# Patient Record
Sex: MALE | Race: WHITE | URBAN - METROPOLITAN AREA
[De-identification: names, ages, dates, MRNs, and addresses within clinical notes are randomized per-mention and may not be internally consistent; named-entity substitution may affect disease eponyms.]

---

## 2020-12-29 ENCOUNTER — INPATIENT (INPATIENT)
Facility: HOSPITAL | Age: 85
LOS: 8 days | Discharge: HOME | DRG: 177 | End: 2021-01-07
Attending: INTERNAL MEDICINE | Admitting: INTERNAL MEDICINE
Payer: MEDICARE

## 2020-12-29 VITALS
OXYGEN SATURATION: 95 % | HEART RATE: 90 BPM | DIASTOLIC BLOOD PRESSURE: 62 MMHG | RESPIRATION RATE: 20 BRPM | SYSTOLIC BLOOD PRESSURE: 93 MMHG

## 2020-12-29 LAB
ALBUMIN SERPL ELPH-MCNC: 3.3 G/DL — SIGNIFICANT CHANGE UP (ref 3.3–5)
ALP SERPL-CCNC: 76 U/L — SIGNIFICANT CHANGE UP (ref 40–120)
ALT FLD-CCNC: 13 U/L — SIGNIFICANT CHANGE UP (ref 10–45)
ANION GAP SERPL CALC-SCNC: 14 MMOL/L — SIGNIFICANT CHANGE UP (ref 5–17)
AST SERPL-CCNC: 23 U/L — SIGNIFICANT CHANGE UP (ref 10–40)
BASE EXCESS BLDV CALC-SCNC: 0.9 MMOL/L — SIGNIFICANT CHANGE UP (ref -2–2)
BASOPHILS # BLD AUTO: 0.03 K/UL — SIGNIFICANT CHANGE UP (ref 0–0.2)
BASOPHILS NFR BLD AUTO: 0.4 % — SIGNIFICANT CHANGE UP (ref 0–2)
BILIRUB SERPL-MCNC: 0.3 MG/DL — SIGNIFICANT CHANGE UP (ref 0.2–1.2)
BUN SERPL-MCNC: 44 MG/DL — HIGH (ref 7–23)
CA-I SERPL-SCNC: 1.22 MMOL/L — SIGNIFICANT CHANGE UP (ref 1.12–1.3)
CALCIUM SERPL-MCNC: 9 MG/DL — SIGNIFICANT CHANGE UP (ref 8.4–10.5)
CHLORIDE BLDV-SCNC: 100 MMOL/L — SIGNIFICANT CHANGE UP (ref 96–108)
CHLORIDE SERPL-SCNC: 97 MMOL/L — SIGNIFICANT CHANGE UP (ref 96–108)
CO2 BLDV-SCNC: 28 MMOL/L — SIGNIFICANT CHANGE UP (ref 22–30)
CO2 SERPL-SCNC: 23 MMOL/L — SIGNIFICANT CHANGE UP (ref 22–31)
CREAT SERPL-MCNC: 2.15 MG/DL — HIGH (ref 0.5–1.3)
EOSINOPHIL # BLD AUTO: 1.13 K/UL — HIGH (ref 0–0.5)
EOSINOPHIL NFR BLD AUTO: 14.4 % — HIGH (ref 0–6)
GAS PNL BLDV: 136 MMOL/L — SIGNIFICANT CHANGE UP (ref 135–145)
GAS PNL BLDV: SIGNIFICANT CHANGE UP
GAS PNL BLDV: SIGNIFICANT CHANGE UP
GLUCOSE BLDV-MCNC: 126 MG/DL — HIGH (ref 70–99)
GLUCOSE SERPL-MCNC: 125 MG/DL — HIGH (ref 70–99)
HCO3 BLDV-SCNC: 27 MMOL/L — SIGNIFICANT CHANGE UP (ref 21–29)
HCT VFR BLD CALC: 35.2 % — LOW (ref 39–50)
HCT VFR BLDA CALC: 37 % — LOW (ref 39–50)
HGB BLD CALC-MCNC: 12.1 G/DL — LOW (ref 13–17)
HGB BLD-MCNC: 11.4 G/DL — LOW (ref 13–17)
HOROWITZ INDEX BLDV+IHG-RTO: SIGNIFICANT CHANGE UP
IMM GRANULOCYTES NFR BLD AUTO: 0.1 % — SIGNIFICANT CHANGE UP (ref 0–1.5)
LACTATE BLDV-MCNC: 2.2 MMOL/L — HIGH (ref 0.7–2)
LYMPHOCYTES # BLD AUTO: 2.65 K/UL — SIGNIFICANT CHANGE UP (ref 1–3.3)
LYMPHOCYTES # BLD AUTO: 33.8 % — SIGNIFICANT CHANGE UP (ref 13–44)
MCHC RBC-ENTMCNC: 29.2 PG — SIGNIFICANT CHANGE UP (ref 27–34)
MCHC RBC-ENTMCNC: 32.4 GM/DL — SIGNIFICANT CHANGE UP (ref 32–36)
MCV RBC AUTO: 90 FL — SIGNIFICANT CHANGE UP (ref 80–100)
MONOCYTES # BLD AUTO: 0.26 K/UL — SIGNIFICANT CHANGE UP (ref 0–0.9)
MONOCYTES NFR BLD AUTO: 3.3 % — SIGNIFICANT CHANGE UP (ref 2–14)
NEUTROPHILS # BLD AUTO: 3.77 K/UL — SIGNIFICANT CHANGE UP (ref 1.8–7.4)
NEUTROPHILS NFR BLD AUTO: 48 % — SIGNIFICANT CHANGE UP (ref 43–77)
NRBC # BLD: 0 /100 WBCS — SIGNIFICANT CHANGE UP (ref 0–0)
PCO2 BLDV: 51 MMHG — HIGH (ref 35–50)
PH BLDV: 7.34 — LOW (ref 7.35–7.45)
PLATELET # BLD AUTO: 110 K/UL — LOW (ref 150–400)
PO2 BLDV: 36 MMHG — SIGNIFICANT CHANGE UP (ref 25–45)
POTASSIUM BLDV-SCNC: 4.5 MMOL/L — SIGNIFICANT CHANGE UP (ref 3.5–5.3)
POTASSIUM SERPL-MCNC: 4.5 MMOL/L — SIGNIFICANT CHANGE UP (ref 3.5–5.3)
POTASSIUM SERPL-SCNC: 4.5 MMOL/L — SIGNIFICANT CHANGE UP (ref 3.5–5.3)
PROT SERPL-MCNC: 7.4 G/DL — SIGNIFICANT CHANGE UP (ref 6–8.3)
RBC # BLD: 3.91 M/UL — LOW (ref 4.2–5.8)
RBC # FLD: 14.2 % — SIGNIFICANT CHANGE UP (ref 10.3–14.5)
SAO2 % BLDV: 59 % — LOW (ref 67–88)
SODIUM SERPL-SCNC: 134 MMOL/L — LOW (ref 135–145)
TROPONIN T, HIGH SENSITIVITY RESULT: 29 NG/L — SIGNIFICANT CHANGE UP (ref 0–51)
WBC # BLD: 7.85 K/UL — SIGNIFICANT CHANGE UP (ref 3.8–10.5)
WBC # FLD AUTO: 7.85 K/UL — SIGNIFICANT CHANGE UP (ref 3.8–10.5)

## 2020-12-29 PROCEDURE — 93010 ELECTROCARDIOGRAM REPORT: CPT

## 2020-12-29 PROCEDURE — 99285 EMERGENCY DEPT VISIT HI MDM: CPT | Mod: CS,GC

## 2020-12-29 RX ORDER — SODIUM CHLORIDE 9 MG/ML
1000 INJECTION INTRAMUSCULAR; INTRAVENOUS; SUBCUTANEOUS ONCE
Refills: 0 | Status: COMPLETED | OUTPATIENT
Start: 2020-12-29 | End: 2020-12-29

## 2020-12-29 RX ADMIN — SODIUM CHLORIDE 1000 MILLILITER(S): 9 INJECTION INTRAMUSCULAR; INTRAVENOUS; SUBCUTANEOUS at 23:30

## 2020-12-29 NOTE — ED ADULT NURSE NOTE - OBJECTIVE STATEMENT
89y M, A&Ox2, disoriented to year, PMH CLL (not on treatment), AAA s/p repair, R kidney removal 2/2 kidney CA  presents to the ED c/o loss of appetite over past several weeks, moreso over past 4 days. Pt visited PCP yesterday, dx possibly dehydrated and pneumonia. Pt fell earlier this morning out of bed unwitnessed w/ complaints of R sided extremity and hip pain. While being assisted to bathroom, pt syncopized- witnessed by family. Gross neuro intact, course crackles heard bilaterally, no difficulty speaking in complete sentences, s1s2 heart sounds heard, pulses x 4, abdomen soft nontender nondistended, multiple skin tears and hematomas present on assessment.

## 2020-12-29 NOTE — ED PROVIDER NOTE - OBJECTIVE STATEMENT
89M CLL (not on treatment), AAA s/p repair, R kidney removal 2/2 kidney CA  p/w loss of appetite over past several weeks & especially over past 4 days. Seen by PCP yesterday who said pt possibly dehydrated, started pt on treatment for PNA (levaquin), pt has been hypotensive, barely tolerating PO. Pt fell earlier this morning out of bed unwitnessed w/ complaints of R sided extremity, hip pain. This pm while being assisted to bathroom syncopized witnessed by family w/o further trauma.     PCP Kang oGodman ([facility]) 89M CLL (not on treatment), AAA s/p repair, R kidney removal 2/2 kidney CA  p/w loss of appetite over past several weeks & especially over past 4 days. Seen by PCP yesterday who said pt possibly dehydrated, started pt on treatment for PNA (levaquin), pt has been hypotensive, barely tolerating PO. Pt fell earlier this morning out of bed unwitnessed w/ complaints of R sided extremity, hip pain. This pm while being assisted to bathroom temorarily lost consciousness for one minute witnessed by family w/o further trauma.  Per family pt feels very unwell. PT aox3 however very little insight into own situation, unsure of events occuring recently.     PCP Kang Goodman ([facility]) 89M CLL (not on treatment), AAA s/p repair, R kidney removal 2/2 kidney CA  p/w loss of appetite over past several weeks & especially over past 4 days. Seen by PCP yesterday who said pt possibly dehydrated, started pt on treatment for PNA (levaquin), pt has been hypotensive, barely tolerating PO. Pt fell earlier this morning out of bed unwitnessed w/ complaints of R sided extremity, hip pain. This pm while being assisted to bathroom temporarily lost consciousness for one minute witnessed by family w/o further trauma.  Per family pt feels very unwell. PT aox3 however very little insight into own situation, unsure of events occurring recently.     PCP Kang Goodman ([facility])

## 2020-12-29 NOTE — ED PROVIDER NOTE - PHYSICAL EXAMINATION
.pe Gen: WDWN, NAD, cachectic appearing, BP 90s/60s  HEENT: EOMI, no nasal discharge, mucous membranes moist  CV: RRR, no M/R/G  Resp: Scattered crackles b/l worse on L side w/ decreased BS throughout   GI: Abdomen soft non-distended, NTTP, no masses  Skin: Multiple scattered skin tears on extremities, abrasion on L shin   Neuro: CN2-12 grossly intact, A&Ox4, MS +5/5 in UE and LE BL, gross sensation intact in UE and LE BL, gait deferred, negative pronator drift  Psych: appropriate mood, denies SI  MSK: no LE swelling, full ROM LUE,b/l LE w/ pain-limited ROM R shoulder, no neck tenderness

## 2020-12-29 NOTE — ED PROVIDER NOTE - NS ED ROS FT
Gen: Denies fever, weight loss  CV: Denies chest pain, palpitations  Skin: + abrasions + ecchymoses   Resp: Denies SOB + productive cough   GI: Denies constipation, nausea, vomiting, abd pain  Msk: Denies back pain, LE swelling, neck pain, + extremity pain  : Denies dysuria, increased frequency  Neuro: + LOC + weakness   Psych: Denies hx of psych, hallucinations

## 2020-12-29 NOTE — ED ADULT NURSE NOTE - NSIMPLEMENTINTERV_GEN_ALL_ED
Implemented All Fall Risk Interventions:  [place] to call system. Call bell, personal items and telephone within reach. Instruct patient to call for assistance. Room bathroom lighting operational. Non-slip footwear when patient is off stretcher. Physically safe environment: no spills, clutter or unnecessary equipment. Stretcher in lowest position, wheels locked, appropriate side rails in place. Provide visual cue, wrist band, yellow gown, etc. Monitor gait and stability. Monitor for mental status changes and reorient to person, place, and time. Review medications for side effects contributing to fall risk. Reinforce activity limits and safety measures with patient and family.

## 2020-12-29 NOTE — ED PROVIDER NOTE - ATTENDING CONTRIBUTION TO CARE
pt is a 88 y/o male with syncope doesn't recall the event, to discuss with family, ekg with ST but apparently pos family with covid from home, possible infectious cause, vss, infectious, cardiac work up ordered, non focal neuro exam, no signs of injury, signed out pending work up.

## 2020-12-29 NOTE — ED ADULT NURSE NOTE - CHIEF COMPLAINT QUOTE
syncope, fell today, hypotension. c/o rt shoulder, arm pain, on aspirin. Seen and examined by PMD and was Dx with Bronchitis, swab pending results.

## 2020-12-30 DIAGNOSIS — R55 SYNCOPE AND COLLAPSE: ICD-10-CM

## 2020-12-30 DIAGNOSIS — E78.5 HYPERLIPIDEMIA, UNSPECIFIED: ICD-10-CM

## 2020-12-30 DIAGNOSIS — F03.90 UNSPECIFIED DEMENTIA WITHOUT BEHAVIORAL DISTURBANCE: ICD-10-CM

## 2020-12-30 DIAGNOSIS — N39.0 URINARY TRACT INFECTION, SITE NOT SPECIFIED: ICD-10-CM

## 2020-12-30 DIAGNOSIS — N17.9 ACUTE KIDNEY FAILURE, UNSPECIFIED: ICD-10-CM

## 2020-12-30 DIAGNOSIS — E86.0 DEHYDRATION: ICD-10-CM

## 2020-12-30 LAB
APPEARANCE UR: ABNORMAL
APTT BLD: 24 SEC — LOW (ref 27.5–35.5)
BACTERIA # UR AUTO: ABNORMAL
BASE EXCESS BLDV CALC-SCNC: 1.2 MMOL/L — SIGNIFICANT CHANGE UP (ref -2–2)
BILIRUB UR-MCNC: NEGATIVE — SIGNIFICANT CHANGE UP
CA-I SERPL-SCNC: 1.13 MMOL/L — SIGNIFICANT CHANGE UP (ref 1.12–1.3)
CHLORIDE BLDV-SCNC: 106 MMOL/L — SIGNIFICANT CHANGE UP (ref 96–108)
CO2 BLDV-SCNC: 28 MMOL/L — SIGNIFICANT CHANGE UP (ref 22–30)
COLOR SPEC: YELLOW — SIGNIFICANT CHANGE UP
DIFF PNL FLD: ABNORMAL
EPI CELLS # UR: 4 — SIGNIFICANT CHANGE UP
GAS PNL BLDV: 132 MMOL/L — LOW (ref 135–145)
GAS PNL BLDV: SIGNIFICANT CHANGE UP
GAS PNL BLDV: SIGNIFICANT CHANGE UP
GLUCOSE BLDV-MCNC: 122 MG/DL — HIGH (ref 70–99)
GLUCOSE UR QL: NEGATIVE — SIGNIFICANT CHANGE UP
HCO3 BLDV-SCNC: 27 MMOL/L — SIGNIFICANT CHANGE UP (ref 21–29)
HCT VFR BLDA CALC: 32 % — LOW (ref 39–50)
HGB BLD CALC-MCNC: 10.3 G/DL — LOW (ref 13–17)
HOROWITZ INDEX BLDV+IHG-RTO: SIGNIFICANT CHANGE UP
HYALINE CASTS # UR AUTO: 2 /LPF — SIGNIFICANT CHANGE UP (ref 0–7)
INR BLD: 1.16 RATIO — SIGNIFICANT CHANGE UP (ref 0.88–1.16)
KETONES UR-MCNC: NEGATIVE — SIGNIFICANT CHANGE UP
LACTATE BLDV-MCNC: 1 MMOL/L — SIGNIFICANT CHANGE UP (ref 0.7–2)
LEUKOCYTE ESTERASE UR-ACNC: ABNORMAL
NITRITE UR-MCNC: NEGATIVE — SIGNIFICANT CHANGE UP
OTHER CELLS CSF MANUAL: 11 ML/DL — LOW (ref 18–22)
PCO2 BLDV: 49 MMHG — SIGNIFICANT CHANGE UP (ref 35–50)
PH BLDV: 7.36 — SIGNIFICANT CHANGE UP (ref 7.35–7.45)
PH UR: 6 — SIGNIFICANT CHANGE UP (ref 5–8)
PO2 BLDV: 46 MMHG — HIGH (ref 25–45)
POTASSIUM BLDV-SCNC: 4.2 MMOL/L — SIGNIFICANT CHANGE UP (ref 3.5–5.3)
PROT UR-MCNC: 100 — SIGNIFICANT CHANGE UP
PROTHROM AB SERPL-ACNC: 13.8 SEC — HIGH (ref 10.6–13.6)
RBC CASTS # UR COMP ASSIST: 18 /HPF — HIGH (ref 0–4)
SAO2 % BLDV: 78 % — SIGNIFICANT CHANGE UP (ref 67–88)
SARS-COV-2 RNA SPEC QL NAA+PROBE: SIGNIFICANT CHANGE UP
SP GR SPEC: 1.02 — SIGNIFICANT CHANGE UP (ref 1.01–1.02)
TROPONIN T, HIGH SENSITIVITY RESULT: 25 NG/L — SIGNIFICANT CHANGE UP (ref 0–51)
TROPONIN T, HIGH SENSITIVITY RESULT: 27 NG/L — SIGNIFICANT CHANGE UP (ref 0–51)
UROBILINOGEN FLD QL: NEGATIVE — SIGNIFICANT CHANGE UP
WBC UR QL: 2037 /HPF — HIGH (ref 0–5)

## 2020-12-30 PROCEDURE — 72170 X-RAY EXAM OF PELVIS: CPT | Mod: 26

## 2020-12-30 PROCEDURE — 71250 CT THORAX DX C-: CPT | Mod: 26

## 2020-12-30 PROCEDURE — 99223 1ST HOSP IP/OBS HIGH 75: CPT | Mod: AI

## 2020-12-30 PROCEDURE — 73030 X-RAY EXAM OF SHOULDER: CPT | Mod: 26,RT

## 2020-12-30 PROCEDURE — 70450 CT HEAD/BRAIN W/O DYE: CPT | Mod: 26

## 2020-12-30 PROCEDURE — 71045 X-RAY EXAM CHEST 1 VIEW: CPT | Mod: 26

## 2020-12-30 RX ORDER — CEFTRIAXONE 500 MG/1
1000 INJECTION, POWDER, FOR SOLUTION INTRAMUSCULAR; INTRAVENOUS ONCE
Refills: 0 | Status: COMPLETED | OUTPATIENT
Start: 2020-12-30 | End: 2020-12-30

## 2020-12-30 RX ORDER — FINASTERIDE 5 MG/1
1 TABLET, FILM COATED ORAL
Qty: 0 | Refills: 0 | DISCHARGE

## 2020-12-30 RX ORDER — AZITHROMYCIN 500 MG/1
500 TABLET, FILM COATED ORAL DAILY
Refills: 0 | Status: DISCONTINUED | OUTPATIENT
Start: 2020-12-30 | End: 2021-01-01

## 2020-12-30 RX ORDER — CEFTRIAXONE 500 MG/1
1000 INJECTION, POWDER, FOR SOLUTION INTRAMUSCULAR; INTRAVENOUS EVERY 24 HOURS
Refills: 0 | Status: DISCONTINUED | OUTPATIENT
Start: 2020-12-30 | End: 2020-12-31

## 2020-12-30 RX ORDER — HEPARIN SODIUM 5000 [USP'U]/ML
5000 INJECTION INTRAVENOUS; SUBCUTANEOUS EVERY 8 HOURS
Refills: 0 | Status: DISCONTINUED | OUTPATIENT
Start: 2020-12-30 | End: 2021-01-07

## 2020-12-30 RX ORDER — POLYETHYLENE GLYCOL 3350 17 G/17G
17 POWDER, FOR SOLUTION ORAL
Refills: 0 | Status: DISCONTINUED | OUTPATIENT
Start: 2020-12-30 | End: 2021-01-07

## 2020-12-30 RX ORDER — ATORVASTATIN CALCIUM 80 MG/1
20 TABLET, FILM COATED ORAL AT BEDTIME
Refills: 0 | Status: DISCONTINUED | OUTPATIENT
Start: 2020-12-30 | End: 2021-01-07

## 2020-12-30 RX ORDER — AZITHROMYCIN 500 MG/1
500 TABLET, FILM COATED ORAL ONCE
Refills: 0 | Status: COMPLETED | OUTPATIENT
Start: 2020-12-30 | End: 2020-12-30

## 2020-12-30 RX ORDER — DONEPEZIL HYDROCHLORIDE 10 MG/1
5 TABLET, FILM COATED ORAL AT BEDTIME
Refills: 0 | Status: DISCONTINUED | OUTPATIENT
Start: 2020-12-30 | End: 2021-01-07

## 2020-12-30 RX ORDER — DONEPEZIL HYDROCHLORIDE 10 MG/1
1 TABLET, FILM COATED ORAL
Qty: 0 | Refills: 0 | DISCHARGE

## 2020-12-30 RX ORDER — SODIUM CHLORIDE 9 MG/ML
1000 INJECTION INTRAMUSCULAR; INTRAVENOUS; SUBCUTANEOUS
Refills: 0 | Status: DISCONTINUED | OUTPATIENT
Start: 2020-12-30 | End: 2021-01-04

## 2020-12-30 RX ORDER — ROSUVASTATIN CALCIUM 5 MG/1
1 TABLET ORAL
Qty: 0 | Refills: 0 | DISCHARGE

## 2020-12-30 RX ORDER — ASPIRIN/CALCIUM CARB/MAGNESIUM 324 MG
81 TABLET ORAL DAILY
Refills: 0 | Status: DISCONTINUED | OUTPATIENT
Start: 2020-12-30 | End: 2021-01-07

## 2020-12-30 RX ORDER — KETOTIFEN FUMARATE 0.34 MG/ML
1 SOLUTION OPHTHALMIC
Refills: 0 | Status: DISCONTINUED | OUTPATIENT
Start: 2020-12-30 | End: 2021-01-07

## 2020-12-30 RX ORDER — INFLUENZA VIRUS VACCINE 15; 15; 15; 15 UG/.5ML; UG/.5ML; UG/.5ML; UG/.5ML
0.5 SUSPENSION INTRAMUSCULAR ONCE
Refills: 0 | Status: COMPLETED | OUTPATIENT
Start: 2020-12-30 | End: 2020-12-30

## 2020-12-30 RX ORDER — FINASTERIDE 5 MG/1
5 TABLET, FILM COATED ORAL DAILY
Refills: 0 | Status: DISCONTINUED | OUTPATIENT
Start: 2020-12-30 | End: 2021-01-07

## 2020-12-30 RX ORDER — METOPROLOL TARTRATE 50 MG
25 TABLET ORAL DAILY
Refills: 0 | Status: DISCONTINUED | OUTPATIENT
Start: 2020-12-30 | End: 2021-01-04

## 2020-12-30 RX ORDER — SENNA PLUS 8.6 MG/1
2 TABLET ORAL AT BEDTIME
Refills: 0 | Status: DISCONTINUED | OUTPATIENT
Start: 2020-12-30 | End: 2021-01-07

## 2020-12-30 RX ORDER — ASPIRIN/CALCIUM CARB/MAGNESIUM 324 MG
0 TABLET ORAL
Qty: 0 | Refills: 0 | DISCHARGE

## 2020-12-30 RX ADMIN — HEPARIN SODIUM 5000 UNIT(S): 5000 INJECTION INTRAVENOUS; SUBCUTANEOUS at 15:16

## 2020-12-30 RX ADMIN — CEFTRIAXONE 100 MILLIGRAM(S): 500 INJECTION, POWDER, FOR SOLUTION INTRAMUSCULAR; INTRAVENOUS at 04:25

## 2020-12-30 RX ADMIN — SODIUM CHLORIDE 80 MILLILITER(S): 9 INJECTION INTRAMUSCULAR; INTRAVENOUS; SUBCUTANEOUS at 04:25

## 2020-12-30 RX ADMIN — ATORVASTATIN CALCIUM 20 MILLIGRAM(S): 80 TABLET, FILM COATED ORAL at 22:43

## 2020-12-30 RX ADMIN — Medication 25 MILLIGRAM(S): at 17:14

## 2020-12-30 RX ADMIN — Medication 81 MILLIGRAM(S): at 17:14

## 2020-12-30 RX ADMIN — KETOTIFEN FUMARATE 1 DROP(S): 0.34 SOLUTION OPHTHALMIC at 22:57

## 2020-12-30 RX ADMIN — AZITHROMYCIN 500 MILLIGRAM(S): 500 TABLET, FILM COATED ORAL at 17:14

## 2020-12-30 RX ADMIN — AZITHROMYCIN 250 MILLIGRAM(S): 500 TABLET, FILM COATED ORAL at 05:10

## 2020-12-30 RX ADMIN — FINASTERIDE 5 MILLIGRAM(S): 5 TABLET, FILM COATED ORAL at 17:13

## 2020-12-30 RX ADMIN — DONEPEZIL HYDROCHLORIDE 5 MILLIGRAM(S): 10 TABLET, FILM COATED ORAL at 22:57

## 2020-12-30 RX ADMIN — SENNA PLUS 2 TABLET(S): 8.6 TABLET ORAL at 22:43

## 2020-12-30 NOTE — PATIENT PROFILE ADULT - NSPROGENPREVTRANSF_GEN_A_NUR
Patient unable to recall if he had a previous blood transfusion*/no history of blood product transfusion

## 2020-12-30 NOTE — H&P ADULT - HISTORY OF PRESENT ILLNESS
89M CLL (not on treatment), AAA s/p repair, R kidney removal 2/2 kidney CA  p/w weakness, fatigue and fall x 4 days. Seen by PCP yesterday who said pt possibly dehydrated, started pt on treatment for PNA (levaquin),  Pt fell out of bed unwitnessed and while being assisted to bathroom temporarily lost consciousness for one minute witnessed by family w/o further trauma.  Per family pt feels very unwell. Denies fevers, chills, abdominal pain, dysuria, melena or hematochezia.

## 2020-12-30 NOTE — PROCEDURE NOTE - PROCEDURE DATE TIME, MLM
[facility]  Hospitalist Progress  Note     Daksha Garcia Patient Status:  Inpatient      72year old [facility] [id number]   Location 541/541-A Attending Kaylee Rod MD   Hosp Day # 3 PCP Kyara Quinonez MD     ASSESSMENT/PLAN    Eliu Esteves ulcers, the largest is about 3 cm in diameter. It is malodorous. Nontender, no fluctuance or induration. The foot is warm but definite pulse cannot be palpated. Skin: Skin is warm and dry. No rashes, erythema, diaphoresis.    Psych: Normal mood and affe discussed the case with vascular surgery PA, will hold off on CT angiogram of the carotid 30-Dec-2020 17:05

## 2020-12-30 NOTE — H&P ADULT - NSHPLABSRESULTS_GEN_ALL_CORE
11.4   7.85  )-----------( 110      ( 29 Dec 2020 23:21 )             35.2         134<L>  |  97  |  44<H>  ----------------------------<  125<H>  4.5   |  23  |  2.15<H>    Ca    9.0      29 Dec 2020 23:21    TPro  7.4  /  Alb  3.3  /  TBili  0.3  /  DBili  x   /  AST  23  /  ALT  13  /  AlkPhos  76        Urinalysis Basic - ( 30 Dec 2020 06:14 )    Color: Yellow / Appearance: Turbid / S.018 / pH: x  Gluc: x / Ketone: Negative  / Bili: Negative / Urobili: Negative   Blood: x / Protein: 100 / Nitrite: Negative   Leuk Esterase: Large / RBC: 18 /hpf / WBC 2037 /HPF   Sq Epi: x / Non Sq Epi: 4 / Bacteria: Few    < from: Xray Chest 1 View- PORTABLE-Urgent (20 @ 00:51) >    IMPRESSION:  Faint patchy opacity in the right midlung and curvilinear opacities in the left midlung which may represent scarring or atelectasis. Recommend CT chest for further evaluation. Left upper lobe pneumonia cannot be ruled out.    < end of copied text >    ekg sinus tachy 103

## 2020-12-30 NOTE — PHYSICAL THERAPY INITIAL EVALUATION ADULT - DISCHARGE DISPOSITION, PT EVAL
Subacute Rehab; if pt to go home, will require home PT and assistance for ALL functional activities/mobility, pt owns rolling walker/rehabilitation facility

## 2020-12-30 NOTE — PHYSICAL THERAPY INITIAL EVALUATION ADULT - ADDITIONAL COMMENTS
Pt lives w/ his sister and nephew in a in a pvt home has 1+3 AXEL, +RHR. Pt was independent w/ all ADLs and functional mobility w/ a RW. Pt lives alone in private house; his sister and nephew live upstairs. 1+3 AXEL, +RHR. Pt was independent w/ all ADLs and functional mobility w/ a RW. contd from above: CT chest:Bronchial wall thickening in the right middle and lower lobe with associated peribronchovascular consolidation. This may be due to infection or possibly aspiration in the right clinical setting due to its location and appearance. CT head: (-); Xray pelvis:No evidence of acute fracture or dislocation. Mild narrowing of the medial right hip joint space. Mild degenerative changes of the left hip; Xray left shoulder: (-)fx    Pt lives alone in private house; his sister and nephew live upstairs. 1+3 AXEL, +RHR. Pt was independent w/ all ADLs and functional mobility w/ a RW.

## 2020-12-30 NOTE — PHYSICAL THERAPY INITIAL EVALUATION ADULT - PERTINENT HX OF CURRENT PROBLEM, REHAB EVAL
89 y/o M w/ PMH of CLL (not on treatment), AAA s/p repair, R kidney removal 2/2 kidney CA  p/w weakness, fatigue and fall x 4 days found to have UTI

## 2020-12-30 NOTE — ED ADULT NURSE REASSESSMENT NOTE - NS ED NURSE REASSESS COMMENT FT1
UA collected. Pt urinated only enough for UA not UC. Report given to holding. Fluids and ABX infusing.

## 2020-12-30 NOTE — H&P ADULT - PROBLEM SELECTOR PLAN 2
likely due to dehydration from poor po intake due to uti   trops flat, ekg no st t changes  c/w IVF  check ct chest ? TERESA opacity

## 2020-12-30 NOTE — PROCEDURE NOTE - NSURITECHNIQUE_GU_A_CORE
Proper hand hygiene was performed/Sterile gloves were worn for the duration of the procedure/A sterile drape was used to cover all adjacent areas/The site was cleaned with soap/water and sterile solution (betadine)/The catheter was appropriately lubricated/All applicable medical record documentation is completed

## 2020-12-30 NOTE — PROCEDURE NOTE - ADDITIONAL PROCEDURE DETAILS
Called by medicine for difficult guerra placement for urinary retention with bladder scan ~500cc. ER RN attempted 16 F guerra but unable to advance past prostate and now with hematuria. At the bedside, noted ~100cc dark pink clear urine. Patient prepped and draped with sterile technique. Attempted guerra placement with 14F & 16F silicone, 18F & 20F coude and 22F silicone three-way with perineal pressure but unable to advance past the prostate. Suspect false passage. Bladder rescanned with ~413cc. Patient attempted to void again and able to void ~20cc. Patient reports feeling comfortable. Will monitor to see if patient able to void as currently not having any pain and able to void while in the ED. Encourage double voiding. Monitor bladder scans. If residual >500cc or becomes uncomfortable, please call urology and will proceed with bedside cystoscopy.       Of note, patient denies urologic history. No issues with voiding in the past. Denies seeing a urologist. Does not recall if he has had a guerra in the past.      above discussed will medicine and will observe for now. Please call urology with any questions pager [phone]

## 2020-12-30 NOTE — CHART NOTE - NSCHARTNOTEFT_GEN_A_CORE
Notified by RN that patient on Tele with brief episode of SVT -200's for approximately 5 secs. while trying to urinate . Patient seen and evaluated at bedside. Denies chest pain, SOB, palpitation, dizziness, abdominal pain, fever, chills. Reports some blood in urine while urinating    Vital Signs Last 24 Hrs  T(C): 37.4 (30 Dec 2020 20:05), Max: 37.4 (30 Dec 2020 20:05)  T(F): 99.3 (30 Dec 2020 20:05), Max: 99.3 (30 Dec 2020 20:05)  HR: 78 (30 Dec 2020 20:05) (78 - 90)  BP: 128/70 (30 Dec 2020 20:05) (93/62 - 128/70)  RR: 18 (30 Dec 2020 20:05) (15 - 20)  SpO2: 95% (30 Dec 2020 20:05) (94% - 98%)    Physical Exam  GENERAL: Alert and oriented x3 , no acute distress  NECK: Supple, No JVD  CHEST/LUNG: Clear to auscultation bilaterally; No wheezes, rales or rhonchi  HEART: s1 s2 Regular rate and rhythm; No murmurs, rubs, or gallops  ABDOMEN: Soft, Nontender, Nondistended; Bowel sounds present +suprapubic tenderness  EXTREMITIES:  2+ Peripheral Pulses, No clubbing, cyanosis, or edema    HPI  89M CLL (not on treatment), AAA s/p repair, R kidney removal 2/2 kidney CA  p/w weakness, fatigue and fall x 4 days found to have UTI.   Now with brief episode of SVT -200 for approximately 5 secs while trying to urinate    Impression: Arrythmia ( SVT)  >STAT EKG-NSR @ 85bpm  >Electrolytes reviewed- WNL  > Troponin x1  > Cont Beta blocker- Lopressor 25 mg daily  >Cont Telemetry    Urinary Retention  > Bladder scan STAT  > Urology consulted  > Monitor Urinary output    Nehemias Fitzgerald ANP-C  Department of Medicine  [id number] Notified by RN that patient on Tele with brief episode of SVT -200's for approximately 5 secs. while trying to urinate . Patient seen and evaluated at bedside. Denies chest pain, SOB, palpitation, dizziness, abdominal pain, fever, chills. Reports some blood in urine while urinating    Vital Signs Last 24 Hrs  T(C): 37.4 (30 Dec 2020 20:05), Max: 37.4 (30 Dec 2020 20:05)  T(F): 99.3 (30 Dec 2020 20:05), Max: 99.3 (30 Dec 2020 20:05)  HR: 78 (30 Dec 2020 20:05) (78 - 90)  BP: 128/70 (30 Dec 2020 20:05) (93/62 - 128/70)  RR: 18 (30 Dec 2020 20:05) (15 - 20)  SpO2: 95% (30 Dec 2020 20:05) (94% - 98%)    Physical Exam  GENERAL: Alert and oriented x3 , no acute distress  NECK: Supple, No JVD  CHEST/LUNG: Clear to auscultation bilaterally; No wheezes, rales or rhonchi  HEART: s1 s2 Regular rate and rhythm; No murmurs, rubs, or gallops  ABDOMEN: Soft, Nontender, Nondistended; Bowel sounds present +suprapubic tenderness  EXTREMITIES:  2+ Peripheral Pulses, No clubbing, cyanosis, or edema    HPI  89M CLL (not on treatment), AAA s/p repair, R kidney removal 2/2 kidney CA  p/w weakness, fatigue and fall x 4 days found to have UTI.   Now with brief episode of SVT -200 for approximately 5 secs while trying to urinate    Impression: Arrythmia ( SVT)  >STAT EKG-NSR @ 85bpm  >Electrolytes reviewed- WNL  > Troponin x1  > Cont Beta blocker- Lopressor 25 mg daily  >Cont Telemetry    Urinary Retention  > Bladder scan STAT  > Patient seen by  Urology earlier for urinary retention, catheter unable to advance past the prostate  > Will re-consult Urology  > Monitor urine output    Nehemias Fitzgerald ANP-C  Department of Medicine  [id number]

## 2020-12-30 NOTE — H&P ADULT - NSHPPHYSICALEXAM_GEN_ALL_CORE
Vital Signs Last 24 Hrs  T(C): 36.4 (30 Dec 2020 06:38), Max: 36.6 (30 Dec 2020 05:10)  T(F): 97.5 (30 Dec 2020 06:38), Max: 97.8 (30 Dec 2020 05:10)  HR: 78 (30 Dec 2020 06:38) (78 - 90)  BP: 104/65 (30 Dec 2020 06:38) (93/62 - 125/82)  BP(mean): --  RR: 20 (30 Dec 2020 06:38) (15 - 20)  SpO2: 98% (30 Dec 2020 06:38) (94% - 98%)  CAPILLARY BLOOD GLUCOSE      POCT Blood Glucose.: 118 mg/dL (29 Dec 2020 22:30)    I&O's Summary      PHYSICAL EXAM:  GENERAL: NAD,  multiple ecchymoses and skin tears on arms  HEAD:  Atraumatic, Normocephalic  EYES: EOMI, PERRL, conjunctiva and sclera clear  NECK: Supple, No JVD  CHEST/LUNG: Clear to auscultation bilaterally; No wheezes, rales or rhonchi  HEART: s1 s2 Regular rate and rhythm; No murmurs, rubs, or gallops  ABDOMEN: Soft, Nontender, Nondistended; Bowel sounds present +suprapubic tenderness  EXTREMITIES:  2+ Peripheral Pulses, No clubbing, cyanosis, or edema  NEUROLOGY: AAOx3, non-focal

## 2020-12-30 NOTE — PROVIDER CONTACT NOTE (OTHER) - SITUATION
Patient with urinary frequency, spontaneously voiding approx ~20ml of blood tinged urine. Bladder scan assessed upon admission to floor = 386ml residual.

## 2020-12-30 NOTE — PHYSICAL THERAPY INITIAL EVALUATION ADULT - CRITERIA FOR SKILLED THERAPEUTIC INTERVENTIONS
impairments found/functional limitations in following categories/therapy frequency/predicted duration of therapy intervention/anticipated equipment needs at discharge/anticipated discharge recommendation

## 2020-12-31 DIAGNOSIS — Z71.89 OTHER SPECIFIED COUNSELING: ICD-10-CM

## 2020-12-31 DIAGNOSIS — J18.9 PNEUMONIA, UNSPECIFIED ORGANISM: ICD-10-CM

## 2020-12-31 LAB
ANION GAP SERPL CALC-SCNC: 12 MMOL/L — SIGNIFICANT CHANGE UP (ref 5–17)
BUN SERPL-MCNC: 46 MG/DL — HIGH (ref 7–23)
CALCIUM SERPL-MCNC: 8.1 MG/DL — LOW (ref 8.4–10.5)
CHLORIDE SERPL-SCNC: 103 MMOL/L — SIGNIFICANT CHANGE UP (ref 96–108)
CO2 SERPL-SCNC: 22 MMOL/L — SIGNIFICANT CHANGE UP (ref 22–31)
CREAT SERPL-MCNC: 2.29 MG/DL — HIGH (ref 0.5–1.3)
CULTURE RESULTS: NO GROWTH — SIGNIFICANT CHANGE UP
GLUCOSE SERPL-MCNC: 90 MG/DL — SIGNIFICANT CHANGE UP (ref 70–99)
HCT VFR BLD CALC: 27.6 % — LOW (ref 39–50)
HGB BLD-MCNC: 9 G/DL — LOW (ref 13–17)
LEGIONELLA AG UR QL: NEGATIVE — SIGNIFICANT CHANGE UP
MAGNESIUM SERPL-MCNC: 2.2 MG/DL — SIGNIFICANT CHANGE UP (ref 1.6–2.6)
MCHC RBC-ENTMCNC: 29.1 PG — SIGNIFICANT CHANGE UP (ref 27–34)
MCHC RBC-ENTMCNC: 32.6 GM/DL — SIGNIFICANT CHANGE UP (ref 32–36)
MCV RBC AUTO: 89.3 FL — SIGNIFICANT CHANGE UP (ref 80–100)
NRBC # BLD: 0 /100 WBCS — SIGNIFICANT CHANGE UP (ref 0–0)
PLATELET # BLD AUTO: 104 K/UL — LOW (ref 150–400)
POTASSIUM SERPL-MCNC: 4 MMOL/L — SIGNIFICANT CHANGE UP (ref 3.5–5.3)
POTASSIUM SERPL-SCNC: 4 MMOL/L — SIGNIFICANT CHANGE UP (ref 3.5–5.3)
RBC # BLD: 3.09 M/UL — LOW (ref 4.2–5.8)
RBC # FLD: 14.6 % — HIGH (ref 10.3–14.5)
SODIUM SERPL-SCNC: 137 MMOL/L — SIGNIFICANT CHANGE UP (ref 135–145)
SPECIMEN SOURCE: SIGNIFICANT CHANGE UP
WBC # BLD: 8.64 K/UL — SIGNIFICANT CHANGE UP (ref 3.8–10.5)
WBC # FLD AUTO: 8.64 K/UL — SIGNIFICANT CHANGE UP (ref 3.8–10.5)

## 2020-12-31 PROCEDURE — 76770 US EXAM ABDO BACK WALL COMP: CPT | Mod: 26

## 2020-12-31 RX ORDER — AZTREONAM 2 G
500 VIAL (EA) INJECTION EVERY 8 HOURS
Refills: 0 | Status: DISCONTINUED | OUTPATIENT
Start: 2020-12-31 | End: 2020-12-31

## 2020-12-31 RX ORDER — PIPERACILLIN AND TAZOBACTAM 4; .5 G/20ML; G/20ML
3.38 INJECTION, POWDER, LYOPHILIZED, FOR SOLUTION INTRAVENOUS EVERY 8 HOURS
Refills: 0 | Status: DISCONTINUED | OUTPATIENT
Start: 2020-12-31 | End: 2021-01-06

## 2020-12-31 RX ADMIN — POLYETHYLENE GLYCOL 3350 17 GRAM(S): 17 POWDER, FOR SOLUTION ORAL at 05:25

## 2020-12-31 RX ADMIN — DONEPEZIL HYDROCHLORIDE 5 MILLIGRAM(S): 10 TABLET, FILM COATED ORAL at 22:28

## 2020-12-31 RX ADMIN — CEFTRIAXONE 100 MILLIGRAM(S): 500 INJECTION, POWDER, FOR SOLUTION INTRAMUSCULAR; INTRAVENOUS at 05:25

## 2020-12-31 RX ADMIN — Medication 50 MILLIGRAM(S): at 15:51

## 2020-12-31 RX ADMIN — HEPARIN SODIUM 5000 UNIT(S): 5000 INJECTION INTRAVENOUS; SUBCUTANEOUS at 13:28

## 2020-12-31 RX ADMIN — KETOTIFEN FUMARATE 1 DROP(S): 0.34 SOLUTION OPHTHALMIC at 18:31

## 2020-12-31 RX ADMIN — SODIUM CHLORIDE 80 MILLILITER(S): 9 INJECTION INTRAMUSCULAR; INTRAVENOUS; SUBCUTANEOUS at 22:29

## 2020-12-31 RX ADMIN — Medication 81 MILLIGRAM(S): at 13:28

## 2020-12-31 RX ADMIN — KETOTIFEN FUMARATE 1 DROP(S): 0.34 SOLUTION OPHTHALMIC at 05:28

## 2020-12-31 RX ADMIN — POLYETHYLENE GLYCOL 3350 17 GRAM(S): 17 POWDER, FOR SOLUTION ORAL at 18:31

## 2020-12-31 RX ADMIN — Medication 25 MILLIGRAM(S): at 05:25

## 2020-12-31 RX ADMIN — HEPARIN SODIUM 5000 UNIT(S): 5000 INJECTION INTRAVENOUS; SUBCUTANEOUS at 22:29

## 2020-12-31 RX ADMIN — PIPERACILLIN AND TAZOBACTAM 25 GRAM(S): 4; .5 INJECTION, POWDER, LYOPHILIZED, FOR SOLUTION INTRAVENOUS at 22:28

## 2020-12-31 RX ADMIN — AZITHROMYCIN 500 MILLIGRAM(S): 500 TABLET, FILM COATED ORAL at 13:28

## 2020-12-31 RX ADMIN — FINASTERIDE 5 MILLIGRAM(S): 5 TABLET, FILM COATED ORAL at 13:28

## 2020-12-31 RX ADMIN — ATORVASTATIN CALCIUM 20 MILLIGRAM(S): 80 TABLET, FILM COATED ORAL at 22:28

## 2020-12-31 RX ADMIN — HEPARIN SODIUM 5000 UNIT(S): 5000 INJECTION INTRAVENOUS; SUBCUTANEOUS at 06:28

## 2020-12-31 RX ADMIN — SENNA PLUS 2 TABLET(S): 8.6 TABLET ORAL at 22:29

## 2020-12-31 NOTE — PROGRESS NOTE ADULT - PROBLEM SELECTOR PLAN 2
cannot r/o aspiration given dysphagia  fu ID recs- ?[id number] to cont  aspiration precautions, cont current dysphagia diet

## 2020-12-31 NOTE — SWALLOW BEDSIDE ASSESSMENT ADULT - SWALLOW EVAL: PROGNOSIS
dx continued: No overt signs or symptoms of penetration/aspiration on tsp trials of purees and honey thickened liquids. Would suggest crushing all medications and placing in applesauce when feasible. Suggest instruemental examination to further assess oropharyngeal swallow mechanism, r/o dysphagia and place pt on least restrictive diet.

## 2020-12-31 NOTE — CONSULT NOTE ADULT - ATTENDING COMMENTS
Idania Cisneros M.D.  [facility], Division of Infectious Diseases  [phone]  After 5pm on weekdays and all day on weekends - please call [phone]

## 2020-12-31 NOTE — SWALLOW BEDSIDE ASSESSMENT ADULT - SWALLOW EVAL: DIAGNOSIS
89M CLL (not on treatment), Dementia, AAA s/p repair, R kidney removal 2/2 kidney CA  p/w weakness, fatigue and fall x 4 days. Seen by PCP yesterday who said pt possibly dehydrated, started pt on treatment for PNA (levaquin),  Pt fell out of bed unwitnessed and while being assisted to bathroom temporarily lost consciousness for one minute witnessed by family w/o further trauma. 89M CLL (not on treatment), Dementia, AAA s/p repair, R kidney removal 2/2 kidney CA  p/w weakness, fatigue and fall x 4 days. Seen by PCP yesterday who said pt possibly dehydrated, started pt on treatment for PNA (levaquin),  Pt fell out of bed unwitnessed and while being assisted to bathroom temporarily lost consciousness for one minute witnessed by family w/o further trauma. Pt presents with clinical signs of a suspected pharyngeal dysphagia. Swallow sequence marked by delay in swallow initiation with reduced laryngeal elevation upon palpation across consistencies. Immediate cough post thin liquids; trace throat clears post nectar thickened liquids which is suggestive of penetration/aspiration. Pt c/o inconsistent bolus sensation in throat post puree thick which reportedly clears with a tsp liquid wash of honey thickened liquids. Multiple swallows with puree is suspicious for pharyngeal retention as well.

## 2020-12-31 NOTE — SWALLOW BEDSIDE ASSESSMENT ADULT - SWALLOW EVAL: RECOMMENDED DIET
1) D1 with Honey thickened liquids VIA TEASPOON ONLY 2) 1:1 assist with all meals due to UE weakness 3) Encourage 2 swallows per bite/sip 4) Alternate food and liquids 5) Crush medications and place in applesauce when feasible

## 2020-12-31 NOTE — CONSULT NOTE ADULT - PROBLEM SELECTOR RECOMMENDATION 9
UA with WBC 2037, large LE, negative nitrite, few bacteria.  Symptomatic - complained of dysuria and earlier reported suprapubic pain on admission. Has h/o CLL not on treatment.   S/p ceftriaxone in the ER, now started on aztreonam  Patient reports no antibiotic allergies  Afebrile, no leukocytosis.     Follow blood and urine cultures  Discontinue aztreonam  Start on Pip-tazo 3.375g IV Q12h (renally adjusted, CrCl 21) UA with WBC 2037, large LE, negative nitrite, few bacteria.  Symptomatic - complained of dysuria and earlier reported suprapubic pain on admission. Has h/o CLL not on treatment.   S/p ceftriaxone in the ER, now started on aztreonam  Patient reports no antibiotic allergies  Afebrile, no leukocytosis.     Follow blood and urine cultures  Discontinue aztreonam  Start on Pip-tazo 3.375g IV Q8h (renally adjusted, CrCl 21)

## 2020-12-31 NOTE — SWALLOW BEDSIDE ASSESSMENT ADULT - SLP PERTINENT HISTORY OF CURRENT PROBLEM
89M CLL (not on treatment), Dementia, AAA s/p repair, R kidney removal 2/2 kidney CA  p/w weakness, fatigue and fall x 4 days. Seen by PCP yesterday who said pt possibly dehydrated, started pt on treatment for PNA (levaquin),  Pt fell out of bed unwitnessed and while being assisted to bathroom temporarily lost consciousness for one minute witnessed by family w/o further trauma.  Per family pt feels very unwell. Denies fevers, chills, abdominal pain, dysuria, melena or hematochezia.

## 2020-12-31 NOTE — PROGRESS NOTE ADULT - PROBLEM SELECTOR PLAN 1
u/a+  f/u UCX  ID changed abx to zosyn for double coverage UTI and pna,   monitor for worsening rash

## 2020-12-31 NOTE — SWALLOW BEDSIDE ASSESSMENT ADULT - NS ASR SWALLOW FINDINGS DISCUS
RN Aracely; SHABANA Montoya; Giftyniece at bedside She (Podiatry Resident at this Hospital)/Nursing/Patient/Family

## 2020-12-31 NOTE — SWALLOW BEDSIDE ASSESSMENT ADULT - SLP GENERAL OBSERVATIONS
Pt encountered bedside with great-niece present. Pt AA&Ox2-3 (self, date and Pt aware of being in hospital but unable to recall name). Pt endorses trouble tolerating hard solids for several months with c/o "food getting stuck in his throat with eventual coughing". At home, Pt consumes softer foods and ensure drinks as per niece. Niece reports that pt will occasionally cough but unable to attribute coughing to a specific food consistency or drink. Pt and niece endorse a reduction in appetite in the last few weeks with a feeling of being full shortly after a few bites with inconsistent nausea. Would suggest GI consult due to feelings of satiety with nausea as well as inconsistent bolus sensation (Pt unable to provide specific location for globus sensation - i.e. esophageal vs. pharyngeal. Pt encountered bedside with great-niece present. Pt AA&Ox2-3 (self, date and Pt aware of being in hospital but unable to recall name). Pt is an inconsistent historian with h/o dementia. Pt able to follow simple directive and answer simple yes/no questions for purposes of evaluation. Pt's great-grand niece at bedside and able to provide history as well. Pt endorses trouble tolerating hard solids for several months with c/o "food getting stuck in his throat with eventual coughing". At home, Pt consumes softer foods and ensure drinks as per great-grand niece She. She reports that pt will occasionally cough but unable to attribute coughing to a specific food consistency or drink. Both pt and family endorse a reduction in appetite in the last few weeks with a feeling of being full shortly after a few bites with inconsistent nausea. Would suggest GI consult due to feelings of early satiety with nausea as well as inconsistent bolus sensation (Pt unable to provide specific location for globus sensation - i.e. esophageal vs. pharyngeal).

## 2020-12-31 NOTE — CONSULT NOTE ADULT - ASSESSMENT
Patient is an 89 year old male with PMH of CLL not on treatment, AAA s/p repair, R nephrectomy due to kidney cancer and dementia who presented with complaint of  weakness, fatigue for 4 days and a fall, admitted for UTI and possible pneumonia.

## 2020-12-31 NOTE — SWALLOW BEDSIDE ASSESSMENT ADULT - PHARYNGEAL PHASE
Inconsistent c/o PO "stuck in throat" with puree thick and reportedly clears with a honey thick liquid wash/Decreased laryngeal elevation/Absent laryngeal elevation/Multiple swallows Delayed pharyngeal swallow/Decreased laryngeal elevation/Multiple swallows Delayed pharyngeal swallow/Decreased laryngeal elevation/Cough post oral intake/Multiple swallows Delayed pharyngeal swallow/Decreased laryngeal elevation/Wet vocal quality post oral intake/Throat clear post oral intake

## 2020-12-31 NOTE — SWALLOW BEDSIDE ASSESSMENT ADULT - COMMENTS
Per H&P: Syncope.  Plan: likely due to dehydration from poor po intake due to uti. c/w IVF.   12/30 CXR: Faint patchy opacity in the right midlung and curvilinear opacities in the left midlung which may represent scarring or atelectasis. Recommend CT chest for further evaluation. Left upper lobe pneumonia cannot be ruled out.  12/30 CT Chest: Bronchial wall thickening in the right middle and lower lobe with associated peribronchovascular consolidation. This may be due to infection or possibly aspiration in the right clinical setting due to its location and appearance. Correlate with aspiration history. Enlarged subcarinal lymph measuring up to 1.5 cm, nonspecific. Additional foci of patchy groundglass opacity in the right upper lobe are indeterminate. The differential for this includes infection or  malignancy. If priors are available, they should be submitted to assess for change. If none are available, consider follow-up CT chest in one month.  12/30 CT Brain: 1. No acute intracranial hemorrhage, territorial infarct, mass effect or calvarial fracture.  2. Partial opacification of left mastoid air cells. Correlate for mastoiditis.  12/31: Provider note: Patient has congested cough.  Patient is NPO d/t aspiration risk. *Pt noted with rapid intake via cup sips and required verbal cues and hand-over hand assist to slow rate/size of bolus. Would benefit from tsp administration.

## 2020-12-31 NOTE — SWALLOW BEDSIDE ASSESSMENT ADULT - ADDITIONAL RECOMMENDATIONS
MBS scheduled for 1/2  Monitor for s/s aspiration/laryngeal penetration. If noted:  D/C p.o. intake, provide non-oral nutrition/hydration/meds, and contact this service @ [phone]

## 2020-12-31 NOTE — PROGRESS NOTE ADULT - SUBJECTIVE AND OBJECTIVE BOX
Patient is a 89y old  Male who presents with a chief complaint of I fell (31 Dec 2020 15:31)      INTERVAL HPI/OVERNIGHT EVENTS: noted  pt seen and examined  cough dry,, failed swallow eval, tolerating dysphagia diet  orthostatic earlier  tele-brief episode of svt      Vital Signs Last 24 Hrs  T(C): 36.8 (31 Dec 2020 14:24), Max: 37.4 (30 Dec 2020 20:05)  T(F): 98.2 (31 Dec 2020 14:24), Max: 99.3 (30 Dec 2020 20:05)  HR: 65 (31 Dec 2020 14:24) (65 - 91)  BP: 120/65 (31 Dec 2020 14:24) (104/41 - 128/70)  BP(mean): --  RR: 18 (31 Dec 2020 08:20) (18 - 18)  SpO2: 95% (31 Dec 2020 08:20) (95% - 96%)    aspirin  chewable 81 milliGRAM(s) Oral daily  atorvastatin 20 milliGRAM(s) Oral at bedtime  azithromycin   Tablet 500 milliGRAM(s) Oral daily  donepezil 5 milliGRAM(s) Oral at bedtime  finasteride 5 milliGRAM(s) Oral daily  guaiFENesin   Syrup  (Sugar-Free) 200 milliGRAM(s) Oral every 6 hours PRN  heparin   Injectable 5000 Unit(s) SubCutaneous every 8 hours  influenza   Vaccine 0.5 milliLiter(s) IntraMuscular once  ketotifen 0.025% Ophthalmic Solution 1 Drop(s) Both EYES two times a day  metoprolol succinate ER 25 milliGRAM(s) Oral daily  piperacillin/tazobactam IVPB.. 3.375 Gram(s) IV Intermittent every 8 hours  polyethylene glycol 3350 17 Gram(s) Oral two times a day  senna 2 Tablet(s) Oral at bedtime  sodium chloride 0.9%. 1000 milliLiter(s) IV Continuous <Continuous>      PHYSICAL EXAM:  GENERAL: NAD,   EYES: conjunctiva and sclera clear  ENMT: Moist mucous membranes  NECK: Supple, No JVD, Normal thyroid  CHEST/LUNG: non labored, cta b/l  HEART: Regular rate and rhythm; No murmurs, rubs, or gallops  ABDOMEN: Soft, Nontender, Nondistended; Bowel sounds present  EXTREMITIES:  2+ Peripheral Pulses, No clubbing, cyanosis, or edema  LYMPH: No lymphadenopathy noted  SKIN: No rashes or lesions    Consultant(s) Notes Reviewed:  [x ] YES  [ ] NO  Care Discussed with Consultants/Other Providers [ x] YES  [ ] NO    LABS:                        9.0    8.64  )-----------( 104      ( 31 Dec 2020 09:53 )             27.6     12    137  |  103  |  46<H>  ----------------------------<  90  4.0   |  22  |  2.29<H>    Ca    8.1<L>      31 Dec 2020 09:53  Mg     2.2         TPro  7.4  /  Alb  3.3  /  TBili  0.3  /  DBili  x   /  AST  23  /  ALT  13  /  AlkPhos  76      PT/INR - ( 29 Dec 2020 23:21 )   PT: 13.8 sec;   INR: 1.16 ratio         PTT - ( 29 Dec 2020 23:21 )  PTT:24.0 sec  Urinalysis Basic - ( 30 Dec 2020 06:14 )    Color: Yellow / Appearance: Turbid / S.018 / pH: x  Gluc: x / Ketone: Negative  / Bili: Negative / Urobili: Negative   Blood: x / Protein: 100 / Nitrite: Negative   Leuk Esterase: Large / RBC: 18 /hpf / WBC 2037 /HPF   Sq Epi: x / Non Sq Epi: 4 / Bacteria: Few      CAPILLARY BLOOD GLUCOSE            Urinalysis Basic - ( 30 Dec 2020 06:14 )    Color: Yellow / Appearance: Turbid / S.018 / pH: x  Gluc: x / Ketone: Negative  / Bili: Negative / Urobili: Negative   Blood: x / Protein: 100 / Nitrite: Negative   Leuk Esterase: Large / RBC: 18 /hpf / WBC 2037 /HPF   Sq Epi: x / Non Sq Epi: 4 / Bacteria: Few        Culture - Blood (collected 30 Dec 2020 01:05)  Source: .Blood Blood-Peripheral  Preliminary Report (31 Dec 2020 02:03):    No growth to date.    Culture - Blood (collected 30 Dec 2020 01:05)  Source: .Blood Blood-Peripheral  Preliminary Report (31 Dec 2020 02:03):    No growth to date.        RADIOLOGY & ADDITIONAL TESTS:    Imaging Personally Reviewed:  [x ] YES  [ ] NO

## 2021-01-01 DIAGNOSIS — J18.9 PNEUMONIA, UNSPECIFIED ORGANISM: ICD-10-CM

## 2021-01-01 LAB
ANION GAP SERPL CALC-SCNC: 11 MMOL/L — SIGNIFICANT CHANGE UP (ref 5–17)
BUN SERPL-MCNC: 45 MG/DL — HIGH (ref 7–23)
CALCIUM SERPL-MCNC: 8.1 MG/DL — LOW (ref 8.4–10.5)
CHLORIDE SERPL-SCNC: 108 MMOL/L — SIGNIFICANT CHANGE UP (ref 96–108)
CO2 SERPL-SCNC: 21 MMOL/L — LOW (ref 22–31)
CREAT SERPL-MCNC: 1.89 MG/DL — HIGH (ref 0.5–1.3)
GLUCOSE SERPL-MCNC: 79 MG/DL — SIGNIFICANT CHANGE UP (ref 70–99)
HCT VFR BLD CALC: 27.9 % — LOW (ref 39–50)
HGB BLD-MCNC: 9 G/DL — LOW (ref 13–17)
MCHC RBC-ENTMCNC: 29.4 PG — SIGNIFICANT CHANGE UP (ref 27–34)
MCHC RBC-ENTMCNC: 32.3 GM/DL — SIGNIFICANT CHANGE UP (ref 32–36)
MCV RBC AUTO: 91.2 FL — SIGNIFICANT CHANGE UP (ref 80–100)
NRBC # BLD: 0 /100 WBCS — SIGNIFICANT CHANGE UP (ref 0–0)
PLATELET # BLD AUTO: 108 K/UL — LOW (ref 150–400)
POTASSIUM SERPL-MCNC: 3.8 MMOL/L — SIGNIFICANT CHANGE UP (ref 3.5–5.3)
POTASSIUM SERPL-SCNC: 3.8 MMOL/L — SIGNIFICANT CHANGE UP (ref 3.5–5.3)
RBC # BLD: 3.06 M/UL — LOW (ref 4.2–5.8)
RBC # FLD: 14.6 % — HIGH (ref 10.3–14.5)
SARS-COV-2 IGG SERPL QL IA: NEGATIVE — SIGNIFICANT CHANGE UP
SARS-COV-2 IGM SERPL IA-ACNC: 0.11 INDEX — SIGNIFICANT CHANGE UP
SODIUM SERPL-SCNC: 140 MMOL/L — SIGNIFICANT CHANGE UP (ref 135–145)
WBC # BLD: 7.92 K/UL — SIGNIFICANT CHANGE UP (ref 3.8–10.5)
WBC # FLD AUTO: 7.92 K/UL — SIGNIFICANT CHANGE UP (ref 3.8–10.5)

## 2021-01-01 RX ORDER — LIDOCAINE HYDROCHLORIDE AND EPINEPHRINE 10; 10 MG/ML; UG/ML
10 INJECTION, SOLUTION INFILTRATION; PERINEURAL ONCE
Refills: 0 | Status: COMPLETED | OUTPATIENT
Start: 2021-01-01 | End: 2021-01-01

## 2021-01-01 RX ADMIN — KETOTIFEN FUMARATE 1 DROP(S): 0.34 SOLUTION OPHTHALMIC at 05:43

## 2021-01-01 RX ADMIN — Medication 25 MILLIGRAM(S): at 05:43

## 2021-01-01 RX ADMIN — HEPARIN SODIUM 5000 UNIT(S): 5000 INJECTION INTRAVENOUS; SUBCUTANEOUS at 05:44

## 2021-01-01 RX ADMIN — KETOTIFEN FUMARATE 1 DROP(S): 0.34 SOLUTION OPHTHALMIC at 17:26

## 2021-01-01 RX ADMIN — Medication 81 MILLIGRAM(S): at 11:07

## 2021-01-01 RX ADMIN — POLYETHYLENE GLYCOL 3350 17 GRAM(S): 17 POWDER, FOR SOLUTION ORAL at 05:44

## 2021-01-01 RX ADMIN — HEPARIN SODIUM 5000 UNIT(S): 5000 INJECTION INTRAVENOUS; SUBCUTANEOUS at 21:41

## 2021-01-01 RX ADMIN — DONEPEZIL HYDROCHLORIDE 5 MILLIGRAM(S): 10 TABLET, FILM COATED ORAL at 21:41

## 2021-01-01 RX ADMIN — SENNA PLUS 2 TABLET(S): 8.6 TABLET ORAL at 21:41

## 2021-01-01 RX ADMIN — POLYETHYLENE GLYCOL 3350 17 GRAM(S): 17 POWDER, FOR SOLUTION ORAL at 17:26

## 2021-01-01 RX ADMIN — AZITHROMYCIN 500 MILLIGRAM(S): 500 TABLET, FILM COATED ORAL at 11:07

## 2021-01-01 RX ADMIN — PIPERACILLIN AND TAZOBACTAM 25 GRAM(S): 4; .5 INJECTION, POWDER, LYOPHILIZED, FOR SOLUTION INTRAVENOUS at 05:44

## 2021-01-01 RX ADMIN — ATORVASTATIN CALCIUM 20 MILLIGRAM(S): 80 TABLET, FILM COATED ORAL at 21:41

## 2021-01-01 RX ADMIN — PIPERACILLIN AND TAZOBACTAM 25 GRAM(S): 4; .5 INJECTION, POWDER, LYOPHILIZED, FOR SOLUTION INTRAVENOUS at 21:41

## 2021-01-01 RX ADMIN — LIDOCAINE HYDROCHLORIDE AND EPINEPHRINE 10 MILLILITER(S): 10; 10 INJECTION, SOLUTION INFILTRATION; PERINEURAL at 18:00

## 2021-01-01 RX ADMIN — PIPERACILLIN AND TAZOBACTAM 25 GRAM(S): 4; .5 INJECTION, POWDER, LYOPHILIZED, FOR SOLUTION INTRAVENOUS at 16:33

## 2021-01-01 RX ADMIN — FINASTERIDE 5 MILLIGRAM(S): 5 TABLET, FILM COATED ORAL at 11:07

## 2021-01-01 NOTE — PROGRESS NOTE ADULT - SUBJECTIVE AND OBJECTIVE BOX
Urology Procedure Note    1.  Post void residual  -Bladder scanner applied to lower abdomen. PVR demonstrated 399cc urine.    2  Diagnostic cystoscopy  -The patient was prepped and draped in the usual sterile fashion. A surgical consent was acquired. A surgical timeout was performed to confirm the correct patient and procedure. The cystoscopy was inserted into the urethra atraumatically. Cystoscopy demonstrated a complete obliterated urethra. The true urethral lumen could not be visualized. The cystoscope was removed.    3.  Suprapubic tube placement  -The patient was prepped and draped in the usual sterile fashion. The patient was placed in Trendelenburg position for the duration of the procedure. A surgical consent was acquired. A surgical timeout was performed to confirm the correct patient and procedure. The skin was marked 2 fingerbreadths midline superior to the pubic synthesis. Local anesthesia was administered subcutaneously. A spinal needled was passed percutaneously and urine was aspirated to ascertain correct orientation and depth. A blade was used to incise the skin, subcutaneous tissue, and fascia in a transverse fashion, approximate 1.5cm in length. A suprapubic tube trocar was passed into the bladder with immediate return of urine. The trocar was deviated inferiorly to minimize risk of bowel injury. A 16F catheter was passed into the bladder with immediate return of urine. Balloon was inflated. The SP tube was affixed to the skin using suture. A dressing was applied. This complete the procedure.      -Maintain SP tube      ***I am the covering urologist for the weekend for Erasmo Faust/Joe/Cruzito for the weekend and/or holiday***

## 2021-01-01 NOTE — PROGRESS NOTE ADULT - PROBLEM SELECTOR PLAN 2
cannot r/o aspiration given dysphagia  fu ID recs- ?[id number] to cont  sp swallow eval-aspiration precautions, cont current dysphagia diet

## 2021-01-01 NOTE — CONSULT NOTE ADULT - ASSESSMENT
M with BPH, renal cancer, urethral stricture, urinary retention    -PVR is permissive for this setting if Cr is stable and not in beth retention. US shows no hydro  -PVR q shift. If elevated > 400cc page urology  -Monitor urine output Cr electrolytes  -Hydration  -Pain control  -If guerra catheter is required or develops beth acute retention with anuria, would then need to consider bedside cystoscopy or additional urologic procedures  -Needs to follow up with his urologist as outpatient

## 2021-01-01 NOTE — PROGRESS NOTE ADULT - PROBLEM SELECTOR PLAN 2
possible aspiration pneumonia.  Patient with nonproductive cough for about 2 weeks, no other complaints.   COVID-19 PCR negative   CT chest reported bronchial wall thickening RML and RLL with associated peribronchovascular consolidation  S/p ceftriaxone and azithromycin in the ER  S/p aztreonam, now on azithromycin and pip-tazo  NPO due to aspiration risk, s/p S&S eval - scheduled for MBS 1/2   Blood cultures NGTD  Legionella urine ag negative   Afebrile, no leukocytosis    Aspiration precautions   Discontinue azithromycin  Continue on pip-tazo - plan to treat for total 7 day course

## 2021-01-01 NOTE — PROGRESS NOTE ADULT - SUBJECTIVE AND OBJECTIVE BOX
Patient is a 89y old  Male who presents with a chief complaint of I fell (01 Jan 2021 18:42)      INTERVAL HPI/OVERNIGHT EVENTS: noted  pt seen and examined  events noted- urinary retention 400cc pn bladder scan  urology at bedside trying to insert guerra      Vital Signs Last 24 Hrs  T(C): --  T(F): --  HR: 72 (01 Jan 2021 21:06) (72 - 75)  BP: 153/77 (01 Jan 2021 21:06) (147/75 - 157/85)  BP(mean): --  RR: 18 (01 Jan 2021 21:06) (18 - 18)  SpO2: 99% (01 Jan 2021 21:06) (99% - 99%)    aspirin  chewable 81 milliGRAM(s) Oral daily  atorvastatin 20 milliGRAM(s) Oral at bedtime  donepezil 5 milliGRAM(s) Oral at bedtime  finasteride 5 milliGRAM(s) Oral daily  guaiFENesin   Syrup  (Sugar-Free) 200 milliGRAM(s) Oral every 6 hours PRN  heparin   Injectable 5000 Unit(s) SubCutaneous every 8 hours  influenza   Vaccine 0.5 milliLiter(s) IntraMuscular once  ketotifen 0.025% Ophthalmic Solution 1 Drop(s) Both EYES two times a day  metoprolol succinate ER 25 milliGRAM(s) Oral daily  piperacillin/tazobactam IVPB.. 3.375 Gram(s) IV Intermittent every 8 hours  polyethylene glycol 3350 17 Gram(s) Oral two times a day  senna 2 Tablet(s) Oral at bedtime  sodium chloride 0.9%. 1000 milliLiter(s) IV Continuous <Continuous>      PHYSICAL EXAM:  GENERAL: NAD,   EYES: conjunctiva and sclera clear  ENMT: Moist mucous membranes  NECK: Supple, No JVD, Normal thyroid  CHEST/LUNG: non labored, cta b/l  HEART: Regular rate and rhythm; No murmurs, rubs, or gallops  ABDOMEN: Soft, Nontender, Nondistended; Bowel sounds present  EXTREMITIES:  2+ Peripheral Pulses, No clubbing, cyanosis, or edema  LYMPH: No lymphadenopathy noted  SKIN: No rashes or lesions    Consultant(s) Notes Reviewed:  [x ] YES  [ ] NO  Care Discussed with Consultants/Other Providers [ x] YES  [ ] NO    LABS:                        9.0    7.92  )-----------( 108      ( 01 Jan 2021 07:21 )             27.9     01-01    140  |  108  |  45<H>  ----------------------------<  79  3.8   |  21<L>  |  1.89<H>    Ca    8.1<L>      01 Jan 2021 07:21  Mg     2.2     12-31          CAPILLARY BLOOD GLUCOSE                Culture - Urine (collected 31 Dec 2020 00:34)  Source: .Urine Clean Catch (Midstream)  Final Report (31 Dec 2020 20:30):    No growth    Culture - Blood (collected 30 Dec 2020 01:05)  Source: .Blood Blood-Peripheral  Preliminary Report (31 Dec 2020 02:03):    No growth to date.    Culture - Blood (collected 30 Dec 2020 01:05)  Source: .Blood Blood-Peripheral  Preliminary Report (31 Dec 2020 02:03):    No growth to date.        RADIOLOGY & ADDITIONAL TESTS:    Imaging Personally Reviewed:  [x ] YES  [ ] NO

## 2021-01-01 NOTE — CONSULT NOTE ADULT - ASSESSMENT
89 year old man with history of CLL (not on treatment), AAA s/p repair, R kidney removal 2/2 kidney CA admitted with weakness, fatigue and fall x 4 days    1. Syncope vs LOC, orthostatic hypotension  -likely secondary to orthostatic hypotension  -check echo   -hydrate  -cont to monitor telemetry, brief SVT  -serial orthostatics    dvt ppx

## 2021-01-01 NOTE — PROGRESS NOTE ADULT - PROBLEM SELECTOR PLAN 1
UA with WBC 2037, large LE, negative nitrite, few bacteria.  Symptomatic - complained of dysuria and earlier reported suprapubic pain on admission. Has h/o CLL not on treatment.   S/p ceftriaxone in the ER, then was on aztreonam due to c/f rash with CTX  Patient reports no antibiotic allergies, tolerating pip-tazo with no reaction noted  Urine culture resulted negative.   Blood cultures NGTD  Urology following  Afebrile, no leukocytosis.   Continue on Pip-tazo 3.375g IV Q8h (renally adjusted, CrCl 21).

## 2021-01-01 NOTE — CHART NOTE - NSCHARTNOTEFT_GEN_A_CORE
Patient noted to still be in retention with bladder scan ~350-400cc.  Attempts made to place blind catheter unsuccessful.  Patient's niece Ms. Ho called who is the healthcare proxy, consented for cystoscopy, guerra catheter placement, possible urethral dilation, possible suprapubic tube placement.  Dr. Mcallister was present for cystoscopy and after attempts to advance wire under direct vision, noted large amount of clot and debris with inability to see true/clear lumen.  Procedure aborted and decision made to place suprapubic tube.    Bladder scan repeated which showed ~350cc.    Plan for bedside suprapubic tube placement once more distended to reduce risk of adjacent organ injury.

## 2021-01-01 NOTE — PROGRESS NOTE ADULT - SUBJECTIVE AND OBJECTIVE BOX
[facility], Division of Infectious Diseases  ARLIN Nguyen Y. Patel, S. Shah  [phone]  ([phone] - weekdays after 5pm and weekends)    Name: NICOLE SORIANO  Age/Gender: 89y Male  MRN: [id number]    Interval History:  Patient seen this morning, having breakfast, reports he has no cough or dyspnea but patient has congested sounding cough during visit.  He denies fever, chills, chest pain, abd pain, n/v/d. Patient oriented to self and place.   ROS reviewed, pertinent positives and negatives as above.   Notes reviewed. Afebrile.     Allergies: No Known Allergies ?rash with CTX    Objective:  Vitals:   T(F): 98.4 (12-31-20 @ 21:25), Max: 98.4 (12-31-20 @ 21:25)  HR: 73 (01-01-21 @ 05:38) (59 - 73)  BP: 157/85 (01-01-21 @ 05:38) (120/65 - 157/85)  RR: 18 (01-01-21 @ 05:38) (18 - 18)  SpO2: 99% (01-01-21 @ 05:38) (99% - 99%)    Physical Examination:  General: no acute distress  HEENT: NC/AT, EOMI, anicteric, neck supple  Cardio: S1, S2 heard, RRR, no murmurs  Resp: dec breath sounds bilaterally  Abd: soft, NT, ND, + BS  Neuro: AAOx3, no obvious focal deficits  Ext: no edema or cyanosis, moving extremities  Skin: warm, dry, no visible rash, bruising and skin tears on UEs  Psych: appropriate affect and mood for situation  Lines: PIV    Laboratory Studies:  CBC:                       9.0    7.92  )-----------( 108      ( 01 Jan 2021 07:21 )             27.9     CMP: 01-01    140  |  108  |  45<H>  ----------------------------<  79  3.8   |  21<L>  |  1.89<H>    Ca    8.1<L>      01 Jan 2021 07:21  Mg     2.2     12-31    Microbiology:  12/31 - urine culture - negative  12/31 - COVID-19 ab - negative  12/31 - Legionella ur ag - negative    12/30 - blood cultures - NGTD x2  12/29 - COVID-19 PCR - negative     Radiology:  CT Chest No Cont (12.30.20 @ 10:28) >IMPRESSION: Bronchial wall thickening in the right middle and lower lobe with associated peribronchovascular consolidation. This may be due to infection or possibly aspiration in the right clinical setting due to its location and appearance. Correlate with aspiration history.  Enlarged subcarinal lymph measuring up to 1.5 cm, nonspecific  Additional foci of patchy ground glass opacity in the right upper lobe are indeterminate. The differential for this includes infection or  malignancy. If priors are available, they should be submitted to assess for change. If none are available, consider follow-up CT chest in one month.    Medications:  aspirin  chewable 81 milliGRAM(s) Oral daily  atorvastatin 20 milliGRAM(s) Oral at bedtime  azithromycin   Tablet 500 milliGRAM(s) Oral daily  donepezil 5 milliGRAM(s) Oral at bedtime  finasteride 5 milliGRAM(s) Oral daily  guaiFENesin   Syrup  (Sugar-Free) 200 milliGRAM(s) Oral every 6 hours PRN  heparin   Injectable 5000 Unit(s) SubCutaneous every 8 hours  influenza   Vaccine 0.5 milliLiter(s) IntraMuscular once  ketotifen 0.025% Ophthalmic Solution 1 Drop(s) Both EYES two times a day  metoprolol succinate ER 25 milliGRAM(s) Oral daily  piperacillin/tazobactam IVPB.. 3.375 Gram(s) IV Intermittent every 8 hours  polyethylene glycol 3350 17 Gram(s) Oral two times a day  senna 2 Tablet(s) Oral at bedtime  sodium chloride 0.9%. 1000 milliLiter(s) IV Continuous <Continuous>    Antimicrobials:  azithromycin   Tablet 500 milliGRAM(s) Oral daily  piperacillin/tazobactam IVPB.. 3.375 Gram(s) IV Intermittent every 8 hours

## 2021-01-02 DIAGNOSIS — R33.9 RETENTION OF URINE, UNSPECIFIED: ICD-10-CM

## 2021-01-02 LAB
ANION GAP SERPL CALC-SCNC: 8 MMOL/L — SIGNIFICANT CHANGE UP (ref 5–17)
BUN SERPL-MCNC: 30 MG/DL — HIGH (ref 7–23)
CALCIUM SERPL-MCNC: 7.7 MG/DL — LOW (ref 8.4–10.5)
CHLORIDE SERPL-SCNC: 110 MMOL/L — HIGH (ref 96–108)
CO2 SERPL-SCNC: 22 MMOL/L — SIGNIFICANT CHANGE UP (ref 22–31)
CREAT SERPL-MCNC: 1.48 MG/DL — HIGH (ref 0.5–1.3)
GLUCOSE SERPL-MCNC: 122 MG/DL — HIGH (ref 70–99)
HCT VFR BLD CALC: 27.2 % — LOW (ref 39–50)
HGB BLD-MCNC: 9 G/DL — LOW (ref 13–17)
MAGNESIUM SERPL-MCNC: 2.1 MG/DL — SIGNIFICANT CHANGE UP (ref 1.6–2.6)
MCHC RBC-ENTMCNC: 29.3 PG — SIGNIFICANT CHANGE UP (ref 27–34)
MCHC RBC-ENTMCNC: 33.1 GM/DL — SIGNIFICANT CHANGE UP (ref 32–36)
MCV RBC AUTO: 88.6 FL — SIGNIFICANT CHANGE UP (ref 80–100)
NRBC # BLD: 0 /100 WBCS — SIGNIFICANT CHANGE UP (ref 0–0)
PLATELET # BLD AUTO: 115 K/UL — LOW (ref 150–400)
POTASSIUM SERPL-MCNC: 3.6 MMOL/L — SIGNIFICANT CHANGE UP (ref 3.5–5.3)
POTASSIUM SERPL-SCNC: 3.6 MMOL/L — SIGNIFICANT CHANGE UP (ref 3.5–5.3)
RBC # BLD: 3.07 M/UL — LOW (ref 4.2–5.8)
RBC # FLD: 14.3 % — SIGNIFICANT CHANGE UP (ref 10.3–14.5)
SODIUM SERPL-SCNC: 140 MMOL/L — SIGNIFICANT CHANGE UP (ref 135–145)
WBC # BLD: 8.09 K/UL — SIGNIFICANT CHANGE UP (ref 3.8–10.5)
WBC # FLD AUTO: 8.09 K/UL — SIGNIFICANT CHANGE UP (ref 3.8–10.5)

## 2021-01-02 PROCEDURE — 74230 X-RAY XM SWLNG FUNCJ C+: CPT | Mod: 26

## 2021-01-02 RX ORDER — AMLODIPINE BESYLATE 2.5 MG/1
2.5 TABLET ORAL DAILY
Refills: 0 | Status: DISCONTINUED | OUTPATIENT
Start: 2021-01-02 | End: 2021-01-03

## 2021-01-02 RX ORDER — POTASSIUM CHLORIDE 20 MEQ
10 PACKET (EA) ORAL
Refills: 0 | Status: COMPLETED | OUTPATIENT
Start: 2021-01-02 | End: 2021-01-02

## 2021-01-02 RX ADMIN — FINASTERIDE 5 MILLIGRAM(S): 5 TABLET, FILM COATED ORAL at 14:08

## 2021-01-02 RX ADMIN — HEPARIN SODIUM 5000 UNIT(S): 5000 INJECTION INTRAVENOUS; SUBCUTANEOUS at 21:49

## 2021-01-02 RX ADMIN — ATORVASTATIN CALCIUM 20 MILLIGRAM(S): 80 TABLET, FILM COATED ORAL at 21:49

## 2021-01-02 RX ADMIN — DONEPEZIL HYDROCHLORIDE 5 MILLIGRAM(S): 10 TABLET, FILM COATED ORAL at 22:12

## 2021-01-02 RX ADMIN — PIPERACILLIN AND TAZOBACTAM 25 GRAM(S): 4; .5 INJECTION, POWDER, LYOPHILIZED, FOR SOLUTION INTRAVENOUS at 05:42

## 2021-01-02 RX ADMIN — Medication 81 MILLIGRAM(S): at 14:08

## 2021-01-02 RX ADMIN — HEPARIN SODIUM 5000 UNIT(S): 5000 INJECTION INTRAVENOUS; SUBCUTANEOUS at 05:43

## 2021-01-02 RX ADMIN — POLYETHYLENE GLYCOL 3350 17 GRAM(S): 17 POWDER, FOR SOLUTION ORAL at 18:05

## 2021-01-02 RX ADMIN — Medication 100 MILLIEQUIVALENT(S): at 16:00

## 2021-01-02 RX ADMIN — POLYETHYLENE GLYCOL 3350 17 GRAM(S): 17 POWDER, FOR SOLUTION ORAL at 05:42

## 2021-01-02 RX ADMIN — HEPARIN SODIUM 5000 UNIT(S): 5000 INJECTION INTRAVENOUS; SUBCUTANEOUS at 14:08

## 2021-01-02 RX ADMIN — PIPERACILLIN AND TAZOBACTAM 25 GRAM(S): 4; .5 INJECTION, POWDER, LYOPHILIZED, FOR SOLUTION INTRAVENOUS at 14:07

## 2021-01-02 RX ADMIN — Medication 100 MILLIEQUIVALENT(S): at 18:04

## 2021-01-02 RX ADMIN — Medication 25 MILLIGRAM(S): at 05:42

## 2021-01-02 RX ADMIN — PIPERACILLIN AND TAZOBACTAM 25 GRAM(S): 4; .5 INJECTION, POWDER, LYOPHILIZED, FOR SOLUTION INTRAVENOUS at 21:49

## 2021-01-02 RX ADMIN — AMLODIPINE BESYLATE 2.5 MILLIGRAM(S): 2.5 TABLET ORAL at 15:59

## 2021-01-02 RX ADMIN — KETOTIFEN FUMARATE 1 DROP(S): 0.34 SOLUTION OPHTHALMIC at 05:42

## 2021-01-02 RX ADMIN — SENNA PLUS 2 TABLET(S): 8.6 TABLET ORAL at 21:49

## 2021-01-02 RX ADMIN — KETOTIFEN FUMARATE 1 DROP(S): 0.34 SOLUTION OPHTHALMIC at 18:04

## 2021-01-02 NOTE — SWALLOW VFSS/MBS ASSESSMENT ADULT - ORAL PHASE
mild amount of premature spillover to the valleculae with trace amount over the laryngeal surface of th epiglottis prior to the swallow/Incomplete tongue to palate contact/Uncontrolled bolus / spillover in nelsy-pharynx/Uncontrolled bolus / spillover in hypopharynx

## 2021-01-02 NOTE — SWALLOW VFSS/MBS ASSESSMENT ADULT - ASPIRATION AFTER SWALLOW - SILENT
upon reinitiation of fluoro, material noted over the arytenoids along posterior tracheal wall. patient cued to cough and clear material.

## 2021-01-02 NOTE — SWALLOW VFSS/MBS ASSESSMENT ADULT - PHARYNGEAL PHASE COMMENTS
given severity of penetration/aspiration, significant amount of delayed coughing and patient c/o fatigue post x1 teaspoon, no further PO trials administered as patient a high aspiration risk.

## 2021-01-02 NOTE — PROGRESS NOTE ADULT - SUBJECTIVE AND OBJECTIVE BOX
Patient is a 89y old  Male who presents with a chief complaint of I fell (02 Jan 2021 14:05)      INTERVAL HPI/OVERNIGHT EVENTS: noted  pt seen and examined  feels well, kamilah guerra  failed MBS study      Vital Signs Last 24 Hrs  T(C): 36.7 (02 Jan 2021 20:15), Max: 36.9 (02 Jan 2021 14:41)  T(F): 98.1 (02 Jan 2021 20:15), Max: 98.4 (02 Jan 2021 14:41)  HR: 70 (02 Jan 2021 20:15) (59 - 72)  BP: 169/72 (02 Jan 2021 20:15) (153/77 - 169/72)  BP(mean): --  RR: 18 (02 Jan 2021 20:15) (18 - 18)  SpO2: 95% (02 Jan 2021 20:15) (95% - 99%)    amLODIPine   Tablet 2.5 milliGRAM(s) Oral daily  aspirin  chewable 81 milliGRAM(s) Oral daily  atorvastatin 20 milliGRAM(s) Oral at bedtime  donepezil 5 milliGRAM(s) Oral at bedtime  finasteride 5 milliGRAM(s) Oral daily  guaiFENesin   Syrup  (Sugar-Free) 200 milliGRAM(s) Oral every 6 hours PRN  heparin   Injectable 5000 Unit(s) SubCutaneous every 8 hours  influenza   Vaccine 0.5 milliLiter(s) IntraMuscular once  ketotifen 0.025% Ophthalmic Solution 1 Drop(s) Both EYES two times a day  metoprolol succinate ER 25 milliGRAM(s) Oral daily  piperacillin/tazobactam IVPB.. 3.375 Gram(s) IV Intermittent every 8 hours  polyethylene glycol 3350 17 Gram(s) Oral two times a day  senna 2 Tablet(s) Oral at bedtime  sodium chloride 0.9%. 1000 milliLiter(s) IV Continuous <Continuous>      PHYSICAL EXAM:  GENERAL: NAD,   EYES: conjunctiva and sclera clear  ENMT: Moist mucous membranes  NECK: Supple, No JVD, Normal thyroid  CHEST/LUNG: non labored, cta b/l  HEART: Regular rate and rhythm; No murmurs, rubs, or gallops  ABDOMEN: Soft, Nontender, Nondistended; Bowel sounds present  EXTREMITIES:  2+ Peripheral Pulses, No clubbing, cyanosis, or edema  LYMPH: No lymphadenopathy noted  SKIN: No rashes or lesions    Consultant(s) Notes Reviewed:  [x ] YES  [ ] NO  Care Discussed with Consultants/Other Providers [ x] YES  [ ] NO    LABS:                        9.0    8.09  )-----------( 115      ( 02 Jan 2021 10:30 )             27.2     01-02    140  |  110<H>  |  30<H>  ----------------------------<  122<H>  3.6   |  22  |  1.48<H>    Ca    7.7<L>      02 Jan 2021 10:30  Mg     2.1     01-02          CAPILLARY BLOOD GLUCOSE                Culture - Urine (collected 31 Dec 2020 00:34)  Source: .Urine Clean Catch (Midstream)  Final Report (31 Dec 2020 20:30):    No growth        RADIOLOGY & ADDITIONAL TESTS:    Imaging Personally Reviewed:  [x ] YES  [ ] NO

## 2021-01-02 NOTE — SWALLOW VFSS/MBS ASSESSMENT ADULT - THE ABOVE FINDINGS WERE DISCUSSED WITH
SHABANA Ng #[id number] SHABANA Ng #[id number], HCP Chela Ho [phone] and mirela Nowak upon request of HCP. ADALBERTO Paris, SHABANA Ng #[id number], HCP Chela Ho [phone] and mirela Nowak upon request of HCP.

## 2021-01-02 NOTE — PROGRESS NOTE ADULT - SUBJECTIVE AND OBJECTIVE BOX
CARDIOLOGY FOLLOW UP NOTE - DR. BERNABE    Subjective:    denies chest pain, dyspnea, palpitations, dizziness  ROS: otherwise negative   overnight events:      PHYSICAL EXAM:  T(C): 36.4 (21 @ 04:28), Max: 36.4 (21 @ 04:28)  HR: 59 (21 @ 04:28) (59 - 75)  BP: 153/77 (21 @ 21:06) (147/75 - 153/77)  RR: 18 (21 @ 04:28) (18 - 18)  SpO2: 98% (21 @ 04:28) (98% - 99%)  Wt(kg): --  I&O's Summary    2021 07:01  -  2021 07:00  --------------------------------------------------------  IN: 1540 mL / OUT: 700 mL / NET: 840 mL    2021 07:01  -  2021 12:02  --------------------------------------------------------  IN: 120 mL / OUT: 0 mL / NET: 120 mL      Daily     Daily Weight in k.1 (2021 04:28)    Appearance: Normal	  Cardiovascular: Normal S1 S2,RRR, No JVD, No murmurs  Respiratory: Lungs clear to auscultation	  Gastrointestinal:  Soft, Non-tender, + BS	  Extremities: Normal range of motion, No clubbing, cyanosis or edema      Home Medications:  Aricept 5 mg oral tablet: 1 tab(s) orally once a day (at bedtime) (30 Dec 2020 03:40)  aspirin 81 mg oral tablet:  (30 Dec 2020 03:40)  finasteride 5 mg oral tablet: 1 tab(s) orally once a day (30 Dec 2020 03:40)  Levaquin 500 mg oral tablet: 1 tab(s) orally every 24 hours (30 Dec 2020 03:40)  Metamucil:  (30 Dec 2020 03:40)  Metoprolol Succinate ER 25 mg oral tablet, extended release: 1 tab(s) orally once a day (30 Dec 2020 03:40)  rosuvastatin 5 mg oral tablet: 1 tab(s) orally once a day (30 Dec 2020 03:40)      MEDICATIONS  (STANDING):  aspirin  chewable 81 milliGRAM(s) Oral daily  atorvastatin 20 milliGRAM(s) Oral at bedtime  donepezil 5 milliGRAM(s) Oral at bedtime  finasteride 5 milliGRAM(s) Oral daily  heparin   Injectable 5000 Unit(s) SubCutaneous every 8 hours  influenza   Vaccine 0.5 milliLiter(s) IntraMuscular once  ketotifen 0.025% Ophthalmic Solution 1 Drop(s) Both EYES two times a day  metoprolol succinate ER 25 milliGRAM(s) Oral daily  piperacillin/tazobactam IVPB.. 3.375 Gram(s) IV Intermittent every 8 hours  polyethylene glycol 3350 17 Gram(s) Oral two times a day  senna 2 Tablet(s) Oral at bedtime  sodium chloride 0.9%. 1000 milliLiter(s) (80 mL/Hr) IV Continuous <Continuous>      TELEMETRY: 	    ECG:  	  RADIOLOGY:   DIAGNOSTIC TESTING:  [ ] Echocardiogram:  [ ] Catheterization:  [ ] Stress Test:    OTHER: 	    LABS:	 	    CARDIAC MARKERS:  Troponin T, High Sensitivity Result: 27 ng/L ( @ 21:04)  Troponin T, High Sensitivity Result: 25 ng/L ( @ 01:50)  Troponin T, High Sensitivity Result: 29 ng/L ( @ 23:21)                                9.0    8.09  )-----------( 115      ( 2021 10:30 )             27.2         140  |  110<H>  |  30<H>  ----------------------------<  122<H>  3.6   |  22  |  1.48<H>    Ca    7.7<L>      2021 10:30      proBNP:     Lipid Profile:   HgA1c:     Creatinine, Serum: 1.48 mg/dL (21 @ 10:30)  Creatinine, Serum: 1.89 mg/dL (21 @ 07:21)  Creatinine, Serum: 2.29 mg/dL (20 @ 09:53)

## 2021-01-02 NOTE — SWALLOW VFSS/MBS ASSESSMENT ADULT - COMMENTS
Per H&P: Syncope.  Plan: likely due to dehydration from poor po intake due to uti. c/w IVF.   12/30 CXR: Faint patchy opacity in the right midlung and curvilinear opacities in the left midlung which may represent scarring or atelectasis. Recommend CT chest for further evaluation. Left upper lobe pneumonia cannot be ruled out.  12/30 CT Chest: Bronchial wall thickening in the right middle and lower lobe with associated peribronchovascular consolidation. This may be due to infection or possibly aspiration in the right clinical setting due to its location and appearance. Correlate with aspiration history. Enlarged subcarinal lymph measuring up to 1.5 cm, nonspecific. Additional foci of patchy groundglass opacity in the right upper lobe are indeterminate. The differential for this includes infection or  malignancy. If priors are available, they should be submitted to assess for change. If none are available, consider follow-up CT chest in one month.  12/30 CT Brain: 1. No acute intracranial hemorrhage, territorial infarct, mass effect or calvarial fracture.  2. Partial opacification of left mastoid air cells. Correlate for mastoiditis.  12/31: Provider note: Patient has congested cough.  Patient is NPO d/t aspiration risk.

## 2021-01-02 NOTE — PROGRESS NOTE ADULT - PROBLEM SELECTOR PLAN 2
r/o aspiration given dysphagia  fu ID recs- ?[id number] to cont  sp swallow eval , MBS- failed-nonoral means of nutrition-aspiration precautions, d/w sathya Fariasi-hcp- wish pleasure feeds so will resume current dysphagia diet

## 2021-01-02 NOTE — PROGRESS NOTE ADULT - PROBLEM SELECTOR PLAN 1
UA with WBC 2037, large LE, negative nitrite, few bacteria.  Symptomatic - complained of dysuria and earlier reported suprapubic pain on admission. Has h/o CLL not on treatment.   S/p ceftriaxone in the ER, then was on aztreonam due to c/f rash with CTX  Patient reports no antibiotic allergies, tolerating pip-tazo with no reaction noted  Urine culture resulted negative, had taken levofloxacin prior to admission.   Blood cultures NGTD  Urology following, now s/p suprapubic catheter placement 1/2  Afebrile, no leukocytosis.   Continue on Pip-tazo 3.375g IV Q8h (renally adjusted)

## 2021-01-02 NOTE — SWALLOW VFSS/MBS ASSESSMENT ADULT - ADDITIONAL RECOMMENDATIONS
If patient/family wishes are to continue on an oral diet, suggest a conservative diet of Dysphagia 1 and Honey thick liquid. Recommend MD educate family and patient re aspiration risks and consequences of aspiration as dysphagia diet will not eliminate aspiration risk. This service will follow for same.

## 2021-01-02 NOTE — PROGRESS NOTE ADULT - PROBLEM SELECTOR PLAN 2
likely aspiration pneumonia.  Patient with nonproductive cough for about 2 weeks, no other complaints.   COVID-19 PCR negative   CT chest reported bronchial wall thickening RML and RLL with associated peribronchovascular consolidation  S/p ceftriaxone and azithromycin in the ER  S/p aztreonam, s/p azithromycin  S/p S&S eval, s/p MBS - patient high asp risk - rec NPO/non-oral feeding  Blood cultures NGTD  Legionella urine ag negative   Afebrile, no leukocytosis    Continue on pip-tazo - plan to treat for total 7 day course  Aspiration precautions

## 2021-01-02 NOTE — SWALLOW VFSS/MBS ASSESSMENT ADULT - DIAGNOSTIC IMPRESSIONS
89M CLL (not on treatment), Dementia, AAA s/p repair, R kidney removal 2/2 kidney CA  p/w weakness, fatigue and fall x 4 days. Seen by PCP yesterday who said pt possibly dehydrated, started pt on treatment for PNA (levaquin),  Pt fell out of bed unwitnessed and while being assisted to bathroom temporarily lost consciousness for one minute witnessed by family w/o further trauma. Pt now presents with an oropharyngeal dysphagia marked by silent aspiration of puree food. There is reduced bolus control, premature spillover of material to the pharynx, delayed pharyngeal swallows, reduced epiglottic retroflexion, reduced hyolaryngeal excursion and reduced laryngeal closure, resulting in silent laryngeal penetration and silent aspiration. Multiple cued coughs are required in attempts to clear aspirated material. Pt with significant delayed cough response and fatigue from event. No further PO trials administered on exam due to high aspiration risk.

## 2021-01-02 NOTE — SWALLOW VFSS/MBS ASSESSMENT ADULT - LARYNGEAL PENETRATION DURING THE SWALLOW - SILENT
deep without retrieval of material. penetrated material descends in the laryngeal vestibule and to the vocal folds during repeat swallows/Mild/Moderate

## 2021-01-02 NOTE — SWALLOW VFSS/MBS ASSESSMENT ADULT - SLP GENERAL OBSERVATIONS
Pt arrived in radiology secure in SILVIA chair. Pt following commands for evaluation purposes with assistance. Requires frequent repetition of commands suspect due to reduced STM. Pt alert and cooperative. Pt on room air.

## 2021-01-02 NOTE — CHART NOTE - NSCHARTNOTEFT_GEN_A_CORE
Called by tele tech for patient with 4 beats WCT, also subsequently had 3 seconds of PAT to 140's.  Patient awoken from sleep - denies CP, palpations, nausea, abdominal pain.   Vitals:  /84, HR 50's to 60's on tele, RR 18, pulse ox 98%, temp 98.4.  CV:  +S1 and S2.  Pulm:  diminished bilaterally.  GI:  Soft, NT. Serum potassium 3.6, will give 20mEq IV potassium (patient NPO) and repeat labs in am.  Serum mag added to am labs, will f/u result and supplement as needed.  d/w Dr. Duran, recommended also starting low dose amlodipine for HTN.    Berenice Gil NP  [phone]

## 2021-01-02 NOTE — PROGRESS NOTE ADULT - SUBJECTIVE AND OBJECTIVE BOX
[facility], Division of Infectious Diseases  ARLIN Nguyen Y. Patel, S. Shah  [phone]  ([phone] - weekdays after 5pm and weekends)    Name: NICOLE SORIANO  Age/Gender: 89y Male  MRN: [id number]    Interval History:  Patient seen earlier this morning, continues to have cough. Denies fever, chills, chest pain, abd pain, n/v/d.   ROS reviewed, pertinent positives and negatives as above.   Notes reviewed. Afebrile. Suprapubic catheter placed for urinary retention by urology yesterday evening.     Allergies: No Known Allergies ?rash with CTX    Objective:  Vitals:   T(F): 98.1 (01-02-21 @ 12:29), Max: 98.1 (01-02-21 @ 12:29)  HR: 64 (01-02-21 @ 12:29) (59 - 75)  BP: 162/71 (01-02-21 @ 12:29) (147/75 - 162/71)  RR: 18 (01-02-21 @ 12:29) (18 - 18)  SpO2: 98% (01-02-21 @ 12:29) (98% - 99%)    Physical Examination:  General: no acute distress  HEENT: NC/AT, EOMI, anicteric, neck supple  Cardio: S1, S2 heard, RRR, no murmurs  Resp: dec breath sounds bilaterally  Abd: soft, NT, ND, + BS, suprapubic catheter with blood stained dressing  Neuro: AAOxself and place, no obvious focal deficits  Ext: no edema or cyanosis, moving extremities  Skin: warm, dry, no visible rash, bruising and skin tears on UEs  Psych: appropriate affect and mood for situation  Lines: PIV    Laboratory Studies:  CBC:                       9.0    8.09  )-----------( 115      ( 02 Jan 2021 10:30 )             27.2     CMP: 01-02    140  |  110<H>  |  30<H>  ----------------------------<  122<H>  3.6   |  22  |  1.48<H>    Ca    7.7<L>      02 Jan 2021 10:30    Microbiology:  12/31 - urine culture - negative  12/31 - COVID-19 ab - negative  12/31 - Legionella ur ag - negative    12/30 - blood cultures - NGTD x2  12/29 - COVID-19 PCR - negative     Radiology:  US Kidney and Bladder (12.31.20 @ 15:46) > IMPRESSION:  Status post right nephrectomy. No hydronephrosis of the left kidney. Increased cortical echogenicity of the left kidney. Multiple left renal cysts, including a cyst with multiple septations in the lower pole of the left kidney. Distended urinary bladder with moderate amount of debris within the bladder. Correlation with urinalysis is suggested. Trabeculated bladder wall with multiple diverticuli. Enlarged prostate gland.    CT Chest No Cont (12.30.20 @ 10:28) >IMPRESSION: Bronchial wall thickening in the right middle and lower lobe with associated peribronchovascular consolidation. This may be due to infection or possibly aspiration in the right clinical setting due to its location and appearance. Correlate with aspiration history.  Enlarged subcarinal lymph measuring up to 1.5 cm, nonspecific  Additional foci of patchy ground glass opacity in the right upper lobe are indeterminate. The differential for this includes infection or  malignancy. If priors are available, they should be submitted to assess for change. If none are available, consider follow-up CT chest in one month.    Medications:  MEDICATIONS  (STANDING):  aspirin  chewable 81 milliGRAM(s) Oral daily  atorvastatin 20 milliGRAM(s) Oral at bedtime  donepezil 5 milliGRAM(s) Oral at bedtime  finasteride 5 milliGRAM(s) Oral daily  heparin   Injectable 5000 Unit(s) SubCutaneous every 8 hours  influenza   Vaccine 0.5 milliLiter(s) IntraMuscular once  ketotifen 0.025% Ophthalmic Solution 1 Drop(s) Both EYES two times a day  metoprolol succinate ER 25 milliGRAM(s) Oral daily  piperacillin/tazobactam IVPB.. 3.375 Gram(s) IV Intermittent every 8 hours  polyethylene glycol 3350 17 Gram(s) Oral two times a day  senna 2 Tablet(s) Oral at bedtime  sodium chloride 0.9%. 1000 milliLiter(s) (80 mL/Hr) IV Continuous <Continuous>    Antimicrobials:  piperacillin/tazobactam IVPB.. 3.375 Gram(s) IV Intermittent every 8 hours - started 12/31  s/p azithromycin  s/p aztreonam  s/p ceftriaxone

## 2021-01-03 LAB
ANION GAP SERPL CALC-SCNC: 10 MMOL/L — SIGNIFICANT CHANGE UP (ref 5–17)
BUN SERPL-MCNC: 22 MG/DL — SIGNIFICANT CHANGE UP (ref 7–23)
CALCIUM SERPL-MCNC: 8.2 MG/DL — LOW (ref 8.4–10.5)
CHLORIDE SERPL-SCNC: 109 MMOL/L — HIGH (ref 96–108)
CO2 SERPL-SCNC: 20 MMOL/L — LOW (ref 22–31)
CREAT SERPL-MCNC: 1.4 MG/DL — HIGH (ref 0.5–1.3)
GLUCOSE SERPL-MCNC: 81 MG/DL — SIGNIFICANT CHANGE UP (ref 70–99)
HCT VFR BLD CALC: 31.1 % — LOW (ref 39–50)
HGB BLD-MCNC: 10 G/DL — LOW (ref 13–17)
MAGNESIUM SERPL-MCNC: 2.1 MG/DL — SIGNIFICANT CHANGE UP (ref 1.6–2.6)
MCHC RBC-ENTMCNC: 28.7 PG — SIGNIFICANT CHANGE UP (ref 27–34)
MCHC RBC-ENTMCNC: 32.2 GM/DL — SIGNIFICANT CHANGE UP (ref 32–36)
MCV RBC AUTO: 89.1 FL — SIGNIFICANT CHANGE UP (ref 80–100)
NRBC # BLD: 0 /100 WBCS — SIGNIFICANT CHANGE UP (ref 0–0)
PLATELET # BLD AUTO: 130 K/UL — LOW (ref 150–400)
POTASSIUM SERPL-MCNC: 4 MMOL/L — SIGNIFICANT CHANGE UP (ref 3.5–5.3)
POTASSIUM SERPL-SCNC: 4 MMOL/L — SIGNIFICANT CHANGE UP (ref 3.5–5.3)
RBC # BLD: 3.49 M/UL — LOW (ref 4.2–5.8)
RBC # FLD: 14.5 % — SIGNIFICANT CHANGE UP (ref 10.3–14.5)
SODIUM SERPL-SCNC: 139 MMOL/L — SIGNIFICANT CHANGE UP (ref 135–145)
WBC # BLD: 9.66 K/UL — SIGNIFICANT CHANGE UP (ref 3.8–10.5)
WBC # FLD AUTO: 9.66 K/UL — SIGNIFICANT CHANGE UP (ref 3.8–10.5)

## 2021-01-03 PROCEDURE — 93010 ELECTROCARDIOGRAM REPORT: CPT

## 2021-01-03 RX ORDER — HYDRALAZINE HCL 50 MG
10 TABLET ORAL
Refills: 0 | Status: DISCONTINUED | OUTPATIENT
Start: 2021-01-03 | End: 2021-01-07

## 2021-01-03 RX ORDER — AMLODIPINE BESYLATE 2.5 MG/1
5 TABLET ORAL DAILY
Refills: 0 | Status: DISCONTINUED | OUTPATIENT
Start: 2021-01-04 | End: 2021-01-05

## 2021-01-03 RX ORDER — HYDRALAZINE HCL 50 MG
10 TABLET ORAL ONCE
Refills: 0 | Status: COMPLETED | OUTPATIENT
Start: 2021-01-03 | End: 2021-01-03

## 2021-01-03 RX ORDER — AMLODIPINE BESYLATE 2.5 MG/1
2.5 TABLET ORAL ONCE
Refills: 0 | Status: COMPLETED | OUTPATIENT
Start: 2021-01-03 | End: 2021-01-03

## 2021-01-03 RX ADMIN — Medication 200 MILLIGRAM(S): at 21:01

## 2021-01-03 RX ADMIN — AMLODIPINE BESYLATE 2.5 MILLIGRAM(S): 2.5 TABLET ORAL at 14:06

## 2021-01-03 RX ADMIN — KETOTIFEN FUMARATE 1 DROP(S): 0.34 SOLUTION OPHTHALMIC at 05:13

## 2021-01-03 RX ADMIN — ATORVASTATIN CALCIUM 20 MILLIGRAM(S): 80 TABLET, FILM COATED ORAL at 21:02

## 2021-01-03 RX ADMIN — Medication 25 MILLIGRAM(S): at 05:13

## 2021-01-03 RX ADMIN — AMLODIPINE BESYLATE 2.5 MILLIGRAM(S): 2.5 TABLET ORAL at 05:13

## 2021-01-03 RX ADMIN — Medication 81 MILLIGRAM(S): at 12:37

## 2021-01-03 RX ADMIN — DONEPEZIL HYDROCHLORIDE 5 MILLIGRAM(S): 10 TABLET, FILM COATED ORAL at 21:01

## 2021-01-03 RX ADMIN — HEPARIN SODIUM 5000 UNIT(S): 5000 INJECTION INTRAVENOUS; SUBCUTANEOUS at 05:13

## 2021-01-03 RX ADMIN — PIPERACILLIN AND TAZOBACTAM 25 GRAM(S): 4; .5 INJECTION, POWDER, LYOPHILIZED, FOR SOLUTION INTRAVENOUS at 13:07

## 2021-01-03 RX ADMIN — KETOTIFEN FUMARATE 1 DROP(S): 0.34 SOLUTION OPHTHALMIC at 17:05

## 2021-01-03 RX ADMIN — POLYETHYLENE GLYCOL 3350 17 GRAM(S): 17 POWDER, FOR SOLUTION ORAL at 05:14

## 2021-01-03 RX ADMIN — PIPERACILLIN AND TAZOBACTAM 25 GRAM(S): 4; .5 INJECTION, POWDER, LYOPHILIZED, FOR SOLUTION INTRAVENOUS at 05:14

## 2021-01-03 RX ADMIN — SODIUM CHLORIDE 80 MILLILITER(S): 9 INJECTION INTRAMUSCULAR; INTRAVENOUS; SUBCUTANEOUS at 05:15

## 2021-01-03 RX ADMIN — HEPARIN SODIUM 5000 UNIT(S): 5000 INJECTION INTRAVENOUS; SUBCUTANEOUS at 13:07

## 2021-01-03 RX ADMIN — HEPARIN SODIUM 5000 UNIT(S): 5000 INJECTION INTRAVENOUS; SUBCUTANEOUS at 21:02

## 2021-01-03 RX ADMIN — PIPERACILLIN AND TAZOBACTAM 25 GRAM(S): 4; .5 INJECTION, POWDER, LYOPHILIZED, FOR SOLUTION INTRAVENOUS at 22:38

## 2021-01-03 RX ADMIN — Medication 10 MILLIGRAM(S): at 22:36

## 2021-01-03 RX ADMIN — FINASTERIDE 5 MILLIGRAM(S): 5 TABLET, FILM COATED ORAL at 12:37

## 2021-01-03 NOTE — CHART NOTE - NSCHARTNOTEFT_GEN_A_CORE
Informed by nurse patient's /80 and asymptomatic. Patient denies headache, visual changes, palpitations, dizziness, N/V.     Vital Signs Last 24 Hrs  T(C): 36.3 (03 Jan 2021 13:58), Max: 36.7 (02 Jan 2021 20:15)  T(F): 97.4 (03 Jan 2021 13:58), Max: 98.1 (02 Jan 2021 20:15)  HR: 67 (03 Jan 2021 13:58) (67 - 71)  BP: 180/80 (03 Jan 2021 13:59) (169/72 - 181/92)  BP(mean): --  RR: 18 (03 Jan 2021 13:58) (18 - 18)  SpO2: 97% (03 Jan 2021 13:58) (95% - 98%)    Plan   Ordered 2.5mg Norvasc stat.   As per Cardiology, will continue patient on 5mg Norvasc qd for BP control  Will continue to monitor BP.     Brenda Miller RPA-COLETTE  Dept of Medicine  Spectra [phone] Informed by nurse patient's /80 and asymptomatic. Patient denies headache, visual changes, palpitations, dizziness, N/V.     Vital Signs Last 24 Hrs  T(C): 36.3 (03 Jan 2021 13:58), Max: 36.7 (02 Jan 2021 20:15)  T(F): 97.4 (03 Jan 2021 13:58), Max: 98.1 (02 Jan 2021 20:15)  HR: 67 (03 Jan 2021 13:58) (67 - 71)  BP: 180/80 (03 Jan 2021 13:59) (169/72 - 181/92)  BP(mean): --  RR: 18 (03 Jan 2021 13:58) (18 - 18)  SpO2: 97% (03 Jan 2021 13:58) (95% - 98%)    Plan   Ordered 2.5mg Norvasc stat. Patient received 2.5mg at 5am.   As per Cardiology, will start patient on 5mg Norvasc qd for BP control  Will continue to monitor BP.     Brenda VERDUZCO  Dept of Medicine  Spectra [phone] Informed by nurse patient's /80 and asymptomatic. Patient denies headache, visual changes, palpitations, dizziness, N/V.     Vital Signs Last 24 Hrs  T(C): 36.3 (03 Jan 2021 13:58), Max: 36.7 (02 Jan 2021 20:15)  T(F): 97.4 (03 Jan 2021 13:58), Max: 98.1 (02 Jan 2021 20:15)  HR: 67 (03 Jan 2021 13:58) (67 - 71)  BP: 180/80 (03 Jan 2021 13:59) (169/72 - 181/92)  BP(mean): --  RR: 18 (03 Jan 2021 13:58) (18 - 18)  SpO2: 97% (03 Jan 2021 13:58) (95% - 98%)    Assessment:   89M CLL (not on treatment), AAA s/p repair, R kidney removal 2/2 kidney CA  p/w weakness, fatigue and fall x 4 days. Seen by PCP yesterday who said pt possibly dehydrated, started pt on treatment for PNA (levaquin),  Pt fell out of bed unwitnessed and while being assisted to bathroom temporarily lost consciousness for one minute witnessed by family w/o further trauma.  Per family pt feels very unwell. Denies fevers, chills, abdominal pain, dysuria, melena or hematochezia.  (30 Dec 2020 09:28)    /81 on exam - Pt asymptomatic without headache, visual changes, palpitations, dizziness, N/V.     Plan   Ordered 2.5mg Norvasc stat. Patient received 2.5mg at 5am.   As per Cardiology, will start patient on 5mg Norvasc qd for BP control  Will continue to monitor BP.     Brenda Miller RPA-C  Dept of Medicine  Spectra [phone] Informed by nurse patient's /80 and asymptomatic. Patient denies headache, visual changes, palpitations, dizziness, N/V.     Vital Signs Last 24 Hrs  T(C): 36.3 (03 Jan 2021 13:58), Max: 36.7 (02 Jan 2021 20:15)  T(F): 97.4 (03 Jan 2021 13:58), Max: 98.1 (02 Jan 2021 20:15)  HR: 67 (03 Jan 2021 13:58) (67 - 71)  BP: 180/80 (03 Jan 2021 13:59) (169/72 - 181/92)  BP(mean): --  RR: 18 (03 Jan 2021 13:58) (18 - 18)  SpO2: 97% (03 Jan 2021 13:58) (95% - 98%)    Assessment:   89M CLL (not on treatment), AAA s/p repair, R kidney removal 2/2 kidney CA  p/w weakness, fatigue and fall x 4 days. Seen by PCP yesterday who said pt possibly dehydrated, started pt on treatment for PNA (levaquin),  Pt fell out of bed unwitnessed and while being assisted to bathroom temporarily lost consciousness for one minute witnessed by family w/o further trauma.  Per family pt feels very unwell. Denies fevers, chills, abdominal pain, dysuria, melena or hematochezia.  (30 Dec 2020 09:28)    /81 on exam - Pt asymptomatic without headache, visual changes, palpitations, dizziness, N/V.     Plan:  Discussed with Dr. Omer   Ordered 2.5mg Norvasc stat. Patient received 2.5mg at 5am.   As per Cardiology, will start patient on 5mg Norvasc qd for BP control  Will continue to monitor BP.     Brenda VERDUZCO  Dept of Medicine  Spectra [phone]

## 2021-01-03 NOTE — PROGRESS NOTE ADULT - SUBJECTIVE AND OBJECTIVE BOX
CARDIOLOGY FOLLOW UP NOTE - DR. BERNABE (for [facility])    Subjective:    denies chest pain, dyspnea, palpitations, dizziness  ROS: otherwise negative   overnight events:      PHYSICAL EXAM:  T(C): 36.4 (21 @ 04:35), Max: 36.9 (21 @ 14:41)  HR: 71 (21 @ 04:35) (64 - 71)  BP: 179/95 (21 @ 04:35) (162/71 - 179/95)  RR: 18 (21 @ 04:35) (18 - 18)  SpO2: 98% (21 @ 04:35) (95% - 98%)  Wt(kg): --  I&O's Summary    2021 07:01  -  2021 07:00  --------------------------------------------------------  IN: 1180 mL / OUT: 900 mL / NET: 280 mL      Daily     Daily Weight in k (2021 05:01)    Appearance: Normal	  Cardiovascular: Normal S1 S2,RRR, No JVD, No murmurs  Respiratory: Lungs clear to auscultation	  Gastrointestinal:  Soft, Non-tender, + BS	  Extremities: Normal range of motion, No clubbing, cyanosis or edema      Home Medications:  Aricept 5 mg oral tablet: 1 tab(s) orally once a day (at bedtime) (30 Dec 2020 03:40)  aspirin 81 mg oral tablet:  (30 Dec 2020 03:40)  finasteride 5 mg oral tablet: 1 tab(s) orally once a day (30 Dec 2020 03:40)  Levaquin 500 mg oral tablet: 1 tab(s) orally every 24 hours (30 Dec 2020 03:40)  Metamucil:  (30 Dec 2020 03:40)  Metoprolol Succinate ER 25 mg oral tablet, extended release: 1 tab(s) orally once a day (30 Dec 2020 03:40)  rosuvastatin 5 mg oral tablet: 1 tab(s) orally once a day (30 Dec 2020 03:40)      MEDICATIONS  (STANDING):  amLODIPine   Tablet 2.5 milliGRAM(s) Oral daily  aspirin  chewable 81 milliGRAM(s) Oral daily  atorvastatin 20 milliGRAM(s) Oral at bedtime  donepezil 5 milliGRAM(s) Oral at bedtime  finasteride 5 milliGRAM(s) Oral daily  heparin   Injectable 5000 Unit(s) SubCutaneous every 8 hours  influenza   Vaccine 0.5 milliLiter(s) IntraMuscular once  ketotifen 0.025% Ophthalmic Solution 1 Drop(s) Both EYES two times a day  metoprolol succinate ER 25 milliGRAM(s) Oral daily  piperacillin/tazobactam IVPB.. 3.375 Gram(s) IV Intermittent every 8 hours  polyethylene glycol 3350 17 Gram(s) Oral two times a day  senna 2 Tablet(s) Oral at bedtime  sodium chloride 0.9%. 1000 milliLiter(s) (80 mL/Hr) IV Continuous <Continuous>      TELEMETRY: 	    ECG:  	  RADIOLOGY:   DIAGNOSTIC TESTING:  [ ] Echocardiogram:  [ ] Catheterization:  [ ] Stress Test:    OTHER: 	    LABS:	 	    CARDIAC MARKERS:  Troponin T, High Sensitivity Result: 27 ng/L ( @ 21:04)  Troponin T, High Sensitivity Result: 25 ng/L ( @ 01:50)  Troponin T, High Sensitivity Result: 29 ng/L ( @ 23:21)                                10.0   9.66  )-----------( 130      ( 2021 07:19 )             31.1         139  |  109<H>  |  22  ----------------------------<  81  4.0   |  20<L>  |  1.40<H>    Ca    8.2<L>      2021 07:19  Mg     2.1           proBNP:     Lipid Profile:   HgA1c:     Creatinine, Serum: 1.40 mg/dL (21 @ 07:19)  Creatinine, Serum: 1.48 mg/dL (21 @ 10:30)  Creatinine, Serum: 1.89 mg/dL (21 @ 07:21)

## 2021-01-03 NOTE — PROGRESS NOTE ADULT - SUBJECTIVE AND OBJECTIVE BOX
[facility], Division of Infectious Diseases  ARLIN Nguyen Y. Patel, S. Shah  [phone]  ([phone] - weekdays after 5pm and weekends)    Name: NICOLE SORIANO  Age/Gender: 89y Male  MRN: [id number]    Interval History:  Patient reports continued cough, says he has been able to bring up some phelgm.  Denies fever, chills, chest pain, abd pain, n/v/d.   ROS reviewed, pertinent positives and negatives as above.   Notes reviewed. Afebrile.     Allergies: No Known Allergies ?rash with CTX    Objective:  Vitals:   T(F): 97.5 (01-03-21 @ 04:35), Max: 98.4 (01-02-21 @ 14:41)  HR: 71 (01-03-21 @ 04:35) (64 - 71)  BP: 179/95 (01-03-21 @ 04:35) (162/71 - 179/95)  RR: 18 (01-03-21 @ 04:35) (18 - 18)  SpO2: 98% (01-03-21 @ 04:35) (95% - 98%)    Physical Examination:  General: no acute distress  HEENT: NC/AT, EOMI, anicteric, neck supple  Cardio: S1, S2 heard, RRR, no murmurs  Resp: dec breath sounds bilaterally  Abd: soft, NT, ND, + BS, suprapubic catheter with blood stained dressing  Neuro: AAOxself and place, no obvious focal deficits  Ext: no edema or cyanosis, moving extremities  Skin: warm, dry, no visible rash, bruising and skin tears on UEs  Psych: appropriate affect and mood for situation  Lines: PIV    Laboratory Studies:  CBC:                       10.0   9.66  )-----------( 130      ( 03 Jan 2021 07:19 )             31.1     CMP: 01-03    139  |  109<H>  |  22  ----------------------------<  81  4.0   |  20<L>  |  1.40<H>    Ca    8.2<L>      03 Jan 2021 07:19  Mg     2.1     01-03    Microbiology:  12/31 - urine culture - negative  12/31 - COVID-19 ab - negative  12/31 - Legionella ur ag - negative    12/30 - blood cultures - NGTD x2  12/29 - COVID-19 PCR - negative     Radiology:  US Kidney and Bladder (12.31.20 @ 15:46) > IMPRESSION:  Status post right nephrectomy. No hydronephrosis of the left kidney. Increased cortical echogenicity of the left kidney. Multiple left renal cysts, including a cyst with multiple septations in the lower pole of the left kidney. Distended urinary bladder with moderate amount of debris within the bladder. Correlation with urinalysis is suggested. Trabeculated bladder wall with multiple diverticuli. Enlarged prostate gland.    CT Chest No Cont (12.30.20 @ 10:28) >IMPRESSION: Bronchial wall thickening in the right middle and lower lobe with associated peribronchovascular consolidation. This may be due to infection or possibly aspiration in the right clinical setting due to its location and appearance. Correlate with aspiration history.  Enlarged subcarinal lymph measuring up to 1.5 cm, nonspecific  Additional foci of patchy ground glass opacity in the right upper lobe are indeterminate. The differential for this includes infection or  malignancy. If priors are available, they should be submitted to assess for change. If none are available, consider follow-up CT chest in one month.    Medications:  MEDICATIONS  (STANDING):  amLODIPine   Tablet 2.5 milliGRAM(s) Oral daily  aspirin  chewable 81 milliGRAM(s) Oral daily  atorvastatin 20 milliGRAM(s) Oral at bedtime  donepezil 5 milliGRAM(s) Oral at bedtime  finasteride 5 milliGRAM(s) Oral daily  heparin   Injectable 5000 Unit(s) SubCutaneous every 8 hours  influenza   Vaccine 0.5 milliLiter(s) IntraMuscular once  ketotifen 0.025% Ophthalmic Solution 1 Drop(s) Both EYES two times a day  metoprolol succinate ER 25 milliGRAM(s) Oral daily  piperacillin/tazobactam IVPB.. 3.375 Gram(s) IV Intermittent every 8 hours  polyethylene glycol 3350 17 Gram(s) Oral two times a day  senna 2 Tablet(s) Oral at bedtime  sodium chloride 0.9%. 1000 milliLiter(s) (80 mL/Hr) IV Continuous <Continuous>    Antimicrobials:  piperacillin/tazobactam IVPB.. 3.375 Gram(s) IV Intermittent every 8 hours - started 12/31  s/p azithromycin  s/p aztreonam  s/p ceftriaxone

## 2021-01-03 NOTE — PROGRESS NOTE ADULT - PROBLEM SELECTOR PLAN 2
likely aspiration pneumonia.  Patient with nonproductive cough for about 2 weeks, no other complaints.   COVID-19 PCR negative   CT chest reported bronchial wall thickening RML and RLL with associated peribronchovascular consolidation  S/p ceftriaxone and azithromycin in the ER  S/p aztreonam, s/p azithromycin  S/p S&S eval, s/p MBS - patient high asp risk - rec NPO/non-oral feeding  Blood cultures NGTD  Legionella urine ag negative   Afebrile, no leukocytosis  Continue on pip-tazo - plan to treat for total 7 day course, end 1/5  Aspiration precautions

## 2021-01-03 NOTE — PROGRESS NOTE ADULT - PROBLEM SELECTOR PLAN 1
UA with WBC 2037, large LE, negative nitrite, few bacteria.  Symptomatic - complained of dysuria and earlier reported suprapubic pain on admission. Has h/o CLL not on treatment.   S/p ceftriaxone in the ER, then was on aztreonam due to c/f rash with CTX  Patient reports no antibiotic allergies, tolerating pip-tazo with no reaction noted  Urine culture resulted negative, had taken levofloxacin prior to admission.   Blood cultures NGTD  Urology following, now s/p suprapubic catheter placement 1/2  Afebrile, no leukocytosis.   Continue on Pip-tazo 3.375g IV Q8h (renally adjusted) - complete 7 day course end 1/5

## 2021-01-03 NOTE — CHART NOTE - NSCHARTNOTEFT_GEN_A_CORE
PA NOTE:    Informed by nurse patient's /99, 190/80 manually and asymptomatic.   Pt seen at bedside, denies headache, visual changes, palpitations, dizziness, N/V.     Vital Signs Last 24 Hrs  T(C): 36.4 (03 Jan 2021 20:58), Max: 36.4 (03 Jan 2021 04:35)  T(F): 97.6 (03 Jan 2021 20:58), Max: 97.6 (03 Jan 2021 20:58)  HR: 79 (03 Jan 2021 20:58) (67 - 79)  BP: 190/80 (03 Jan 2021 21:45) (173/82 - 190/80)  RR: 18 (03 Jan 2021 20:58) (18 - 18)  SpO2: 98% (03 Jan 2021 20:58) (97% - 98%)    Assessment:   89M CLL (not on treatment), AAA s/p repair, R kidney removal 2/2 kidney CA  p/w weakness, fatigue and fall x 4 days. Seen by PCP yesterday who said pt possibly dehydrated, started pt on treatment for PNA (levaquin),  Pt fell out of bed unwitnessed and while being assisted to bathroom temporarily lost consciousness for one minute witnessed by family w/o further trauma.  Per family pt feels very unwell. Denies fevers, chills, abdominal pain, dysuria, melena or hematochezia.  (30 Dec 2020 09:28)  Now, /99 on exam - Pt asymptomatic without headache, visual changes, palpitations, dizziness, N/V.     Plan:  Discussed with Dr. Samuel from [facility] and recommended to add hydralazine 10mg BID po w/ the first dose being tonight.   Will continue to monitor BP.   Will endorse to day shift.       Jun Salazar  Dept of Medicine   #[phone]

## 2021-01-03 NOTE — PROGRESS NOTE ADULT - SUBJECTIVE AND OBJECTIVE BOX
Patient is a 89y old  Male who presents with a chief complaint of I fell (03 Jan 2021 11:01)      INTERVAL HPI/OVERNIGHT EVENTS: noted  pt seen and examined  tolerating pleasure feeds well      Vital Signs Last 24 Hrs  T(C): 36.3 (03 Jan 2021 13:58), Max: 36.7 (02 Jan 2021 20:15)  T(F): 97.4 (03 Jan 2021 13:58), Max: 98.1 (02 Jan 2021 20:15)  HR: 67 (03 Jan 2021 13:58) (67 - 71)  BP: 180/80 (03 Jan 2021 13:59) (169/72 - 181/92)  BP(mean): --  RR: 18 (03 Jan 2021 13:58) (18 - 18)  SpO2: 97% (03 Jan 2021 13:58) (95% - 98%)    aspirin  chewable 81 milliGRAM(s) Oral daily  atorvastatin 20 milliGRAM(s) Oral at bedtime  donepezil 5 milliGRAM(s) Oral at bedtime  finasteride 5 milliGRAM(s) Oral daily  guaiFENesin   Syrup  (Sugar-Free) 200 milliGRAM(s) Oral every 6 hours PRN  heparin   Injectable 5000 Unit(s) SubCutaneous every 8 hours  influenza   Vaccine 0.5 milliLiter(s) IntraMuscular once  ketotifen 0.025% Ophthalmic Solution 1 Drop(s) Both EYES two times a day  metoprolol succinate ER 25 milliGRAM(s) Oral daily  piperacillin/tazobactam IVPB.. 3.375 Gram(s) IV Intermittent every 8 hours  polyethylene glycol 3350 17 Gram(s) Oral two times a day  senna 2 Tablet(s) Oral at bedtime  sodium chloride 0.9%. 1000 milliLiter(s) IV Continuous <Continuous>      PHYSICAL EXAM:  GENERAL: NAD,   EYES: conjunctiva and sclera clear  ENMT: Moist mucous membranes  NECK: Supple, No JVD, Normal thyroid  CHEST/LUNG: non labored, cta b/l  HEART: Regular rate and rhythm; No murmurs, rubs, or gallops  ABDOMEN: Soft, Nontender, Nondistended; Bowel sounds present  EXTREMITIES:  2+ Peripheral Pulses, No clubbing, cyanosis, or edema  LYMPH: No lymphadenopathy noted  SKIN: No rashes or lesions    Consultant(s) Notes Reviewed:  [x ] YES  [ ] NO  Care Discussed with Consultants/Other Providers [ x] YES  [ ] NO    LABS:                        10.0   9.66  )-----------( 130      ( 03 Jan 2021 07:19 )             31.1     01-03    139  |  109<H>  |  22  ----------------------------<  81  4.0   |  20<L>  |  1.40<H>    Ca    8.2<L>      03 Jan 2021 07:19  Mg     2.1     01-03          CAPILLARY BLOOD GLUCOSE                  RADIOLOGY & ADDITIONAL TESTS:    Imaging Personally Reviewed:  [x ] YES  [ ] NO

## 2021-01-04 DIAGNOSIS — R13.10 DYSPHAGIA, UNSPECIFIED: ICD-10-CM

## 2021-01-04 DIAGNOSIS — R63.0 ANOREXIA: ICD-10-CM

## 2021-01-04 DIAGNOSIS — R53.81 OTHER MALAISE: ICD-10-CM

## 2021-01-04 DIAGNOSIS — Z51.5 ENCOUNTER FOR PALLIATIVE CARE: ICD-10-CM

## 2021-01-04 LAB
ANION GAP SERPL CALC-SCNC: 7 MMOL/L — SIGNIFICANT CHANGE UP (ref 5–17)
ANION GAP SERPL CALC-SCNC: 7 MMOL/L — SIGNIFICANT CHANGE UP (ref 5–17)
ANION GAP SERPL CALC-SCNC: 8 MMOL/L — SIGNIFICANT CHANGE UP (ref 5–17)
BUN SERPL-MCNC: 10 MG/DL — SIGNIFICANT CHANGE UP (ref 7–23)
BUN SERPL-MCNC: 10 MG/DL — SIGNIFICANT CHANGE UP (ref 7–23)
BUN SERPL-MCNC: 14 MG/DL — SIGNIFICANT CHANGE UP (ref 7–23)
CALCIUM SERPL-MCNC: 6.2 MG/DL — CRITICAL LOW (ref 8.4–10.5)
CALCIUM SERPL-MCNC: 7.6 MG/DL — LOW (ref 8.4–10.5)
CALCIUM SERPL-MCNC: 7.8 MG/DL — LOW (ref 8.4–10.5)
CHLORIDE SERPL-SCNC: 105 MMOL/L — SIGNIFICANT CHANGE UP (ref 96–108)
CHLORIDE SERPL-SCNC: 110 MMOL/L — HIGH (ref 96–108)
CHLORIDE SERPL-SCNC: 112 MMOL/L — HIGH (ref 96–108)
CO2 SERPL-SCNC: 20 MMOL/L — LOW (ref 22–31)
CO2 SERPL-SCNC: 20 MMOL/L — LOW (ref 22–31)
CO2 SERPL-SCNC: 23 MMOL/L — SIGNIFICANT CHANGE UP (ref 22–31)
CREAT SERPL-MCNC: 1.09 MG/DL — SIGNIFICANT CHANGE UP (ref 0.5–1.3)
CREAT SERPL-MCNC: 1.17 MG/DL — SIGNIFICANT CHANGE UP (ref 0.5–1.3)
CREAT SERPL-MCNC: 1.23 MG/DL — SIGNIFICANT CHANGE UP (ref 0.5–1.3)
CULTURE RESULTS: SIGNIFICANT CHANGE UP
CULTURE RESULTS: SIGNIFICANT CHANGE UP
GLUCOSE SERPL-MCNC: 120 MG/DL — HIGH (ref 70–99)
GLUCOSE SERPL-MCNC: 92 MG/DL — SIGNIFICANT CHANGE UP (ref 70–99)
GLUCOSE SERPL-MCNC: 93 MG/DL — SIGNIFICANT CHANGE UP (ref 70–99)
HCT VFR BLD CALC: 27.3 % — LOW (ref 39–50)
HGB BLD-MCNC: 9 G/DL — LOW (ref 13–17)
MAGNESIUM SERPL-MCNC: 1.6 MG/DL — SIGNIFICANT CHANGE UP (ref 1.6–2.6)
MAGNESIUM SERPL-MCNC: 1.8 MG/DL — SIGNIFICANT CHANGE UP (ref 1.6–2.6)
MCHC RBC-ENTMCNC: 28.7 PG — SIGNIFICANT CHANGE UP (ref 27–34)
MCHC RBC-ENTMCNC: 33 GM/DL — SIGNIFICANT CHANGE UP (ref 32–36)
MCV RBC AUTO: 86.9 FL — SIGNIFICANT CHANGE UP (ref 80–100)
NRBC # BLD: 0 /100 WBCS — SIGNIFICANT CHANGE UP (ref 0–0)
PHOSPHATE SERPL-MCNC: 2 MG/DL — LOW (ref 2.5–4.5)
PHOSPHATE SERPL-MCNC: 2 MG/DL — LOW (ref 2.5–4.5)
PLATELET # BLD AUTO: 98 K/UL — LOW (ref 150–400)
POTASSIUM SERPL-MCNC: 3.2 MMOL/L — LOW (ref 3.5–5.3)
POTASSIUM SERPL-MCNC: 3.6 MMOL/L — SIGNIFICANT CHANGE UP (ref 3.5–5.3)
POTASSIUM SERPL-MCNC: 4.1 MMOL/L — SIGNIFICANT CHANGE UP (ref 3.5–5.3)
POTASSIUM SERPL-SCNC: 3.2 MMOL/L — LOW (ref 3.5–5.3)
POTASSIUM SERPL-SCNC: 3.6 MMOL/L — SIGNIFICANT CHANGE UP (ref 3.5–5.3)
POTASSIUM SERPL-SCNC: 4.1 MMOL/L — SIGNIFICANT CHANGE UP (ref 3.5–5.3)
RBC # BLD: 3.14 M/UL — LOW (ref 4.2–5.8)
RBC # FLD: 14.2 % — SIGNIFICANT CHANGE UP (ref 10.3–14.5)
SODIUM SERPL-SCNC: 135 MMOL/L — SIGNIFICANT CHANGE UP (ref 135–145)
SODIUM SERPL-SCNC: 138 MMOL/L — SIGNIFICANT CHANGE UP (ref 135–145)
SODIUM SERPL-SCNC: 139 MMOL/L — SIGNIFICANT CHANGE UP (ref 135–145)
SPECIMEN SOURCE: SIGNIFICANT CHANGE UP
SPECIMEN SOURCE: SIGNIFICANT CHANGE UP
WBC # BLD: 7.77 K/UL — SIGNIFICANT CHANGE UP (ref 3.8–10.5)
WBC # FLD AUTO: 7.77 K/UL — SIGNIFICANT CHANGE UP (ref 3.8–10.5)

## 2021-01-04 PROCEDURE — 99221 1ST HOSP IP/OBS SF/LOW 40: CPT

## 2021-01-04 PROCEDURE — 99223 1ST HOSP IP/OBS HIGH 75: CPT

## 2021-01-04 PROCEDURE — 93306 TTE W/DOPPLER COMPLETE: CPT | Mod: 26

## 2021-01-04 RX ORDER — CALCIUM GLUCONATE 100 MG/ML
1 VIAL (ML) INTRAVENOUS ONCE
Refills: 0 | Status: COMPLETED | OUTPATIENT
Start: 2021-01-04 | End: 2021-01-04

## 2021-01-04 RX ORDER — MAGNESIUM SULFATE 500 MG/ML
2 VIAL (ML) INJECTION ONCE
Refills: 0 | Status: COMPLETED | OUTPATIENT
Start: 2021-01-04 | End: 2021-01-04

## 2021-01-04 RX ORDER — POTASSIUM CHLORIDE 20 MEQ
20 PACKET (EA) ORAL
Refills: 0 | Status: DISCONTINUED | OUTPATIENT
Start: 2021-01-04 | End: 2021-01-04

## 2021-01-04 RX ORDER — METOPROLOL TARTRATE 50 MG
50 TABLET ORAL
Refills: 0 | Status: DISCONTINUED | OUTPATIENT
Start: 2021-01-04 | End: 2021-01-07

## 2021-01-04 RX ORDER — POTASSIUM CHLORIDE 20 MEQ
20 PACKET (EA) ORAL ONCE
Refills: 0 | Status: COMPLETED | OUTPATIENT
Start: 2021-01-04 | End: 2021-01-04

## 2021-01-04 RX ORDER — MAGNESIUM SULFATE 500 MG/ML
1 VIAL (ML) INJECTION ONCE
Refills: 0 | Status: COMPLETED | OUTPATIENT
Start: 2021-01-04 | End: 2021-01-04

## 2021-01-04 RX ORDER — POTASSIUM CHLORIDE 20 MEQ
10 PACKET (EA) ORAL
Refills: 0 | Status: COMPLETED | OUTPATIENT
Start: 2021-01-04 | End: 2021-01-04

## 2021-01-04 RX ADMIN — Medication 100 MILLIEQUIVALENT(S): at 18:37

## 2021-01-04 RX ADMIN — Medication 20 MILLIEQUIVALENT(S): at 18:39

## 2021-01-04 RX ADMIN — HEPARIN SODIUM 5000 UNIT(S): 5000 INJECTION INTRAVENOUS; SUBCUTANEOUS at 05:08

## 2021-01-04 RX ADMIN — Medication 100 MILLIEQUIVALENT(S): at 05:08

## 2021-01-04 RX ADMIN — SODIUM CHLORIDE 80 MILLILITER(S): 9 INJECTION INTRAMUSCULAR; INTRAVENOUS; SUBCUTANEOUS at 05:08

## 2021-01-04 RX ADMIN — Medication 100 MILLIEQUIVALENT(S): at 20:36

## 2021-01-04 RX ADMIN — KETOTIFEN FUMARATE 1 DROP(S): 0.34 SOLUTION OPHTHALMIC at 05:09

## 2021-01-04 RX ADMIN — AMLODIPINE BESYLATE 5 MILLIGRAM(S): 2.5 TABLET ORAL at 05:09

## 2021-01-04 RX ADMIN — Medication 100 GRAM(S): at 03:49

## 2021-01-04 RX ADMIN — FINASTERIDE 5 MILLIGRAM(S): 5 TABLET, FILM COATED ORAL at 11:35

## 2021-01-04 RX ADMIN — Medication 100 GRAM(S): at 21:05

## 2021-01-04 RX ADMIN — Medication 100 MILLIEQUIVALENT(S): at 16:29

## 2021-01-04 RX ADMIN — HEPARIN SODIUM 5000 UNIT(S): 5000 INJECTION INTRAVENOUS; SUBCUTANEOUS at 16:18

## 2021-01-04 RX ADMIN — DONEPEZIL HYDROCHLORIDE 5 MILLIGRAM(S): 10 TABLET, FILM COATED ORAL at 21:03

## 2021-01-04 RX ADMIN — Medication 10 MILLIGRAM(S): at 18:37

## 2021-01-04 RX ADMIN — PIPERACILLIN AND TAZOBACTAM 25 GRAM(S): 4; .5 INJECTION, POWDER, LYOPHILIZED, FOR SOLUTION INTRAVENOUS at 23:07

## 2021-01-04 RX ADMIN — Medication 50 GRAM(S): at 16:17

## 2021-01-04 RX ADMIN — PIPERACILLIN AND TAZOBACTAM 25 GRAM(S): 4; .5 INJECTION, POWDER, LYOPHILIZED, FOR SOLUTION INTRAVENOUS at 16:30

## 2021-01-04 RX ADMIN — Medication 25 MILLIGRAM(S): at 02:40

## 2021-01-04 RX ADMIN — KETOTIFEN FUMARATE 1 DROP(S): 0.34 SOLUTION OPHTHALMIC at 18:37

## 2021-01-04 RX ADMIN — Medication 100 MILLIEQUIVALENT(S): at 03:55

## 2021-01-04 RX ADMIN — Medication 62.5 MILLIMOLE(S): at 03:49

## 2021-01-04 RX ADMIN — Medication 81 MILLIGRAM(S): at 11:35

## 2021-01-04 RX ADMIN — Medication 10 MILLIGRAM(S): at 05:08

## 2021-01-04 RX ADMIN — PIPERACILLIN AND TAZOBACTAM 25 GRAM(S): 4; .5 INJECTION, POWDER, LYOPHILIZED, FOR SOLUTION INTRAVENOUS at 05:08

## 2021-01-04 RX ADMIN — ATORVASTATIN CALCIUM 20 MILLIGRAM(S): 80 TABLET, FILM COATED ORAL at 21:03

## 2021-01-04 RX ADMIN — HEPARIN SODIUM 5000 UNIT(S): 5000 INJECTION INTRAVENOUS; SUBCUTANEOUS at 21:03

## 2021-01-04 RX ADMIN — SODIUM CHLORIDE 80 MILLILITER(S): 9 INJECTION INTRAMUSCULAR; INTRAVENOUS; SUBCUTANEOUS at 16:29

## 2021-01-04 RX ADMIN — SODIUM CHLORIDE 80 MILLILITER(S): 9 INJECTION INTRAMUSCULAR; INTRAVENOUS; SUBCUTANEOUS at 11:36

## 2021-01-04 RX ADMIN — Medication 50 MILLIGRAM(S): at 18:40

## 2021-01-04 NOTE — CONSULT NOTE ADULT - SUBJECTIVE AND OBJECTIVE BOX
Urology Consult Note    Chief Complaint:  urinary retention, urethral stricture, renal cancer    History of Present Illness:  M with solitary kidney. Prior kidney remove for renal cancer. he fell. Imaging demonstrated incomplete bladder emptying. Reyes catheter was attempetd to be placed but unsuccessful. PVRs are in the 200s recently. Denies hematuria dysuria urgency frequency renal colic renal stones. Nothing makes it better. Nothing makes it worse. Resting in bed. No new  complaints.    PAST MEDICAL & SURGICAL HISTORY:  No pertinent past medical history        FAMILY HISTORY:      Allergies    No Known Allergies    Intolerances        Social History:  Denies tobacco or alcohol use    Review of Systems:   Constitutional: No weight loss, no weakness  HEENT: No visual loss, no hearing loss, no sneezing  Skin: No rash or itching  CV: No chest pain, no chest pressure  Pulm: No shortness of breath, cough, or sputum  GI: No melena, no constipation  : Per HPI  Neuro: No headache, dizziness  MSK: No muscle pain, no joint pain  Heme: No anemia, bruising, or bleeding  Lymphatics: No enlarged nodes, no history of splenectomy  Psych: No depression of anxiety  Endo: No cold or heat intolerance  Allergies: No asthma, hives    Physical Exam:  Vital signs  T(C): 36.9 (12-31-20 @ 21:25), Max: 36.9 (12-31-20 @ 21:25)  HR: 73 (01-01-21 @ 05:38)  BP: 157/85 (01-01-21 @ 05:38)  SpO2: 99% (01-01-21 @ 05:38)  Wt(kg): --    Gen: No acute distress. Normal mood  HEENT: Normocephalic, neck supple  CV: Hemodynamically stable  Pulm: No increased work of breathing  Abd: soft, non-tender, non-distended  Back: No CVA tenderness, no midline pain  Extremities: No significant deformity or joint abnormality  Neuro: Alert & oriented x3, No focal deficits  Skin: Skin normal color, normal texture  Psych: Normal affect, normal behavior  : Nonpalpable bladder. Normal bladder. Normal phallus, meatus. Scrotum within normal limits. Normal testes, epididymis,   Rectal: Unable to perform due to patient clinical status and positioning.    Labs:      01-01 @ 07:21    WBC 7.92  / Hct 27.9  / SCr 1.89     12-31 @ 09:53    WBC 8.64  / Hct 27.6  / SCr 2.29     01-01    140  |  108  |  45<H>  ----------------------------<  79  3.8   |  21<L>  |  1.89<H>    Ca    8.1<L>      01 Jan 2021 07:21  Mg     2.2     12-31        Renal US:  MPRESSION:    Status post right nephrectomy.    No hydronephrosis of the left kidney. Increased cortical echogenicity of the left kidney. Multiple left renal cysts, including a cyst with multiple septations in the lower pole of the left kidney.    Distended urinary bladder with moderate amount of debris within the bladder. Correlation with urinalysis is suggested.    Trabeculated bladder wall with multiple diverticuli.    Enlarged prostate gland.        
CARDIOLOGY CONSULT NOTE - DR. BERNABE (for [facility])    HPI:  Patient is a 89 year old man with history of CLL (not on treatment), AAA s/p repair, R kidney removal 2/2 kidney CA admitted with weakness, fatigue and fall x 4 days    Pt fell out of bed with episode of LOC       PAST MEDICAL & SURGICAL HISTORY:  No pertinent past medical history          PREVIOUS DIAGNOSTIC TESTING:    [ ] Echocardiogram:  [ ]  Catheterization:  [ ] Stress Test:  	    MEDICATIONS:    Home Medications:  Aricept 5 mg oral tablet: 1 tab(s) orally once a day (at bedtime) (30 Dec 2020 03:40)  aspirin 81 mg oral tablet:  (30 Dec 2020 03:40)  finasteride 5 mg oral tablet: 1 tab(s) orally once a day (30 Dec 2020 03:40)  Levaquin 500 mg oral tablet: 1 tab(s) orally every 24 hours (30 Dec 2020 03:40)  Metamucil:  (30 Dec 2020 03:40)  Metoprolol Succinate ER 25 mg oral tablet, extended release: 1 tab(s) orally once a day (30 Dec 2020 03:40)  rosuvastatin 5 mg oral tablet: 1 tab(s) orally once a day (30 Dec 2020 03:40)      MEDICATIONS  (STANDING):  aspirin  chewable 81 milliGRAM(s) Oral daily  atorvastatin 20 milliGRAM(s) Oral at bedtime  donepezil 5 milliGRAM(s) Oral at bedtime  finasteride 5 milliGRAM(s) Oral daily  heparin   Injectable 5000 Unit(s) SubCutaneous every 8 hours  influenza   Vaccine 0.5 milliLiter(s) IntraMuscular once  ketotifen 0.025% Ophthalmic Solution 1 Drop(s) Both EYES two times a day  metoprolol succinate ER 25 milliGRAM(s) Oral daily  piperacillin/tazobactam IVPB.. 3.375 Gram(s) IV Intermittent every 8 hours  polyethylene glycol 3350 17 Gram(s) Oral two times a day  senna 2 Tablet(s) Oral at bedtime  sodium chloride 0.9%. 1000 milliLiter(s) (80 mL/Hr) IV Continuous <Continuous>      FAMILY HISTORY:  nc    SOCIAL HISTORY:    [x ] Non-smoker  [ ] Smoker  [ ] Alcohol    Allergies    No Known Allergies    Intolerances    	    REVIEW OF SYSTEMS:  CONSTITUTIONAL: No fever, weight loss, or fatigue  EYES: No eye pain, visual disturbances, or discharge  ENMT:  No difficulty hearing, tinnitus, vertigo; No sinus or throat pain  NECK: No pain or stiffness  RESPIRATORY: No cough, wheezing, chills or hemoptysis; No Shortness of Breath  CARDIOVASCULAR: as HPI  GASTROINTESTINAL: No abdominal or epigastric pain. No nausea, vomiting, or hematemesis; No diarrhea or constipation. No melena or hematochezia.  GENITOURINARY: No dysuria, frequency, hematuria, or incontinence  NEUROLOGICAL: No headaches, memory loss, loss of strength, numbness, or tremors  SKIN: No itching, burning, rashes, or lesions   	  [ ] All others negative	  [ ] Unable to obtain    PHYSICAL EXAM:    T(C): 36.9 (12-31-20 @ 21:25), Max: 36.9 (12-31-20 @ 21:25)  HR: 73 (01-01-21 @ 05:38) (59 - 73)  BP: 157/85 (01-01-21 @ 05:38) (156/82 - 157/85)  RR: 18 (01-01-21 @ 05:38) (18 - 18)  SpO2: 99% (01-01-21 @ 05:38) (99% - 99%)  Wt(kg): --  I&O's Summary    31 Dec 2020 07:01  -  01 Jan 2021 07:00  --------------------------------------------------------  IN: 1380 mL / OUT: 50 mL / NET: 1330 mL    01 Jan 2021 07:01  -  01 Jan 2021 15:26  --------------------------------------------------------  IN: 260 mL / OUT: 0 mL / NET: 260 mL      Daily     Daily     Appearance: Normal	  Psychiatry: A & O x 3, Mood & affect appropriate  HEENT:   Normal oral mucosa, PERRL, EOMI	  Lymphatic: No lymphadenopathy  Cardiovascular: Normal S1 S2,RRR, No JVD, No murmurs  Respiratory: Lungs clear to auscultation	  Gastrointestinal:  Soft, Non-tender, + BS	  Skin: No rashes, No ecchymoses, No cyanosis	  Neurologic: Non-focal  Extremities: Normal range of motion, No clubbing, cyanosis or edema  Vascular: Peripheral pulses palpable 2+ bilaterally    TELEMETRY: 	    ECG:  	sr, poss old iwmi  RADIOLOGY:  OTHER: 	  	  LABS:	 	    CARDIAC MARKERS:        proBNP:     Lipid Profile:   HgA1c:   TSH:                           9.0    7.92  )-----------( 108      ( 01 Jan 2021 07:21 )             27.9     01-01    140  |  108  |  45<H>  ----------------------------<  79  3.8   |  21<L>  |  1.89<H>    Ca    8.1<L>      01 Jan 2021 07:21  Mg     2.2     12-31          Creatinine, Serum: 1.89 mg/dL (01-01-21 @ 07:21)  Creatinine, Serum: 2.29 mg/dL (12-31-20 @ 09:53)  Creatinine, Serum: 2.15 mg/dL (12-29-20 @ 23:21)        ASSESSMENT/PLAN: 	              
Patient seen and evaluated at bedside    Chief Complaint:    HPI:  89M CLL (not on treatment), AAA s/p repair, R kidney removal 2/2 kidney CA  p/w weakness, fatigue and fall x 4 days. Seen by PCP the day prior to admission who said pt possibly dehydrated, started pt on treatment for PNA (levaquin),  Pt fell out of bed unwitnessed and while being assisted to bathroom temporarily lost consciousness for one minute witnessed by family w/o further trauma.  Per family pt feels very unwell. Denies fevers, chills, abdominal pain, dysuria, melena or hematochezia.      Pt had +orthostatic vitals during admission.  Currently being treated for UTI and currently on zosyn.    PMHx:   No pertinent past medical history        PSHx:       Allergies:  No Known Allergies      Home Meds:    Current Medications:   amLODIPine   Tablet 5 milliGRAM(s) Oral daily  aspirin  chewable 81 milliGRAM(s) Oral daily  atorvastatin 20 milliGRAM(s) Oral at bedtime  donepezil 5 milliGRAM(s) Oral at bedtime  finasteride 5 milliGRAM(s) Oral daily  guaiFENesin   Syrup  (Sugar-Free) 200 milliGRAM(s) Oral every 6 hours PRN  heparin   Injectable 5000 Unit(s) SubCutaneous every 8 hours  hydrALAZINE 10 milliGRAM(s) Oral two times a day  influenza   Vaccine 0.5 milliLiter(s) IntraMuscular once  ketotifen 0.025% Ophthalmic Solution 1 Drop(s) Both EYES two times a day  metoprolol succinate ER 50 milliGRAM(s) Oral two times a day  piperacillin/tazobactam IVPB.. 3.375 Gram(s) IV Intermittent every 8 hours  polyethylene glycol 3350 17 Gram(s) Oral two times a day  senna 2 Tablet(s) Oral at bedtime  sodium chloride 0.9%. 1000 milliLiter(s) IV Continuous <Continuous>      FAMILY HISTORY:        Physical Exam:  T(F): 97.9 (01-04), Max: 97.9 (01-04)  HR: 72 (01-04) (67 - 79)  BP: 166/78 (01-04) (152/89 - 190/80)  RR: 18 (01-04)  SpO2: 96% (01-04)    Cardiovascular Diagnostic Testing:    ECG: Personally reviewed:    Echo: Personally reviewed:    Stress Testing:    Cath:    Imaging:    CXR: Personally reviewed    Labs: Personally reviewed                        9.0    7.77  )-----------( 98       ( 04 Jan 2021 02:52 )             27.3     01-04    138  |  110<H>  |  14  ----------------------------<  92  3.6   |  20<L>  |  1.23    Ca    7.6<L>      04 Jan 2021 02:52  Phos  2.0     01-04  Mg     1.8     01-04              
[facility], Division of Infectious Diseases  ARLIN Nguyen, DARRIUS Oscar  [phone]  ([phone] - weekdays after 5pm and weekends)    NICOLE SORIANO  89y, Male  [id number]    HPI:  Patient is an 89 year old male with PMH of CLL not on treatment, AAA s/p repair, R nephrectomy due to kidney cancer and dementia who presented with complaint of  weakness, fatigue for 4 days and a fall. Patient reports he has been having a cough for the last 2 weeks, states he mostly brings up saliva has not noticed much sputum. He denies having any dyspnea or chest pain. He states he has been having dysuria for some time which occurs every time he voids, he denies increased frequency, suprapubic pain, flank pain or hematuria. States he has chronic back pain that is unchanged. He denies having any rhinorrhea, sore throat, headache, abdominal pain, nausea, vomiting, diarrhea. He states he had a bowel movement the morning of admission. He states he saw his PCP  the day before admission and was started on levofloxacin for possible pneumonia and was also told he may be dehydrated. Patient also mentions that he did fall out of bed and it was unwitnessed, he complained of right sided arm and hip pain. He also reports that later while he was being assisted to the bathroom he had temporarily lose of consciousness for about a minute, denies having any trauma or hitting his head. Patient lives at home with his sister and nephew, who had reported that patient was feeling very unwell.   In the ER, patient afebrile, saturating well on room air. Labs showed WBC of 7.85, Cr 2.15, COVID negative. UA with WBC 2037, large LE, negative nitrite, few bacteria. CT chest shows peribronchovascular consolidation and bronchial wall thickening in RML and RLL which may be due to aspiration. He was given ceftriaxone and azithromycin in the ER and then continued on azithromycin and started on aztreonam. Reyes attempted but unable to be placed. ID consulted for pneumonia, UTI and further antibiotic management.   Patient seen this afternoon, reports continues to have cough but denies dyspnea or chest pain. He states he has not voided much today and so unsure if he still has dysuria, denies suprapubic or flank pain. He denies fever or chills, no other complaints.   ROS: 14 point review of systems completed, pertinent positives and negatives as per HPI.    Allergies: No Known Allergies  PMH/PSH -- CLL not on treatment, AAA s/p repair, R nephrectomy due to kidney cancer  FH -- noncontributory  Social History -- denies tobacco, alcohol or illicit drug use, lives at home with sister and nephew    Physical Exam--  Vital Signs Last 24 Hrs  T(F): 98.2 (31 Dec 2020 14:24), Max: 99.3 (30 Dec 2020 20:05)  HR: 65 (31 Dec 2020 14:24) (65 - 91)  BP: 120/65 (31 Dec 2020 14:24) (104/41 - 128/70)  RR: 18 (31 Dec 2020 08:20) (18 - 18)  SpO2: 95% (31 Dec 2020 08:20) (95% - 96%)  General: no acute distress  HEENT: NC/AT, EOMI, anicteric, conjunctiva pink and moist, neck supple  Lungs: Decreased breath sounds bilaterally without rales, wheezing or rhonchi  Heart: Regular rate and rhythm. No murmur, rub or gallop.  Abdomen: Soft. ND. NT. +BS. No suprapubic tenderness.   Neuro: AAOx3, no obvious focal deficits   Back: No spinal tenderness. No costovertebral angle tenderness.  Extremities: No cyanosis. No edema.   Skin: Warm. Dry. Good turgor. Ecchymosis and skin tears on UEs  Psychiatric: Appropriate affect and mood for situation.   Lines: PIV    Laboratory & Imaging Data--  CBC:                       9.0    8.64  )-----------( 104      ( 31 Dec 2020 09:53 )             27.6     CMP: 12-    137  |  103  |  46<H>  ----------------------------<  90  4.0   |  22  |  2.29<H>    Ca    8.1<L>      31 Dec 2020 09:53  Mg     2.2     12-    TPro  7.4  /  Alb  3.3  /  TBili  0.3  /  DBili  x   /  AST  23  /  ALT  13  /  AlkPhos  76      LIVER FUNCTIONS - ( 29 Dec 2020 23:21 )  Alb: 3.3 g/dL / Pro: 7.4 g/dL / ALK PHOS: 76 U/L / ALT: 13 U/L / AST: 23 U/L / GGT: x           Urinalysis Basic - ( 30 Dec 2020 06:14 )  Color: Yellow / Appearance: Turbid / S.018 / pH: x  Gluc: x / Ketone: Negative  / Bili: Negative / Urobili: Negative   Blood: x / Protein: 100 / Nitrite: Negative   Leuk Esterase: Large / RBC: 18 /hpf / WBC 2037 /HPF   Sq Epi: x / Non Sq Epi: 4 / Bacteria: Few    Microbiology:    - blood cultures - NGTD x2   - COVID-19 PCR - negative     Radiology--  CT Chest No Cont (20 @ 10:28) >IMPRESSION: Bronchial wall thickening in the right middle and lower lobe with associated peribronchovascular consolidation. This may be due to infection or possibly aspiration in the right clinical setting due to its location and appearance. Correlate with aspiration history.  Enlarged subcarinal lymph measuring up to 1.5 cm, nonspecific  Additional foci of patchy groundglass opacity in the right upper lobe are indeterminate. The differential for this includes infection or  malignancy. Ifpriors are available, they should be submitted to assess for change. If none are available, consider follow-up CT chest in one month.    Active Medications--  aspirin  chewable 81 milliGRAM(s) Oral daily  atorvastatin 20 milliGRAM(s) Oral at bedtime  azithromycin   Tablet 500 milliGRAM(s) Oral daily  aztreonam  IVPB 500 milliGRAM(s) IV Intermittent every 8 hours  donepezil 5 milliGRAM(s) Oral at bedtime  finasteride 5 milliGRAM(s) Oral daily  guaiFENesin   Syrup  (Sugar-Free) 200 milliGRAM(s) Oral every 6 hours PRN  heparin   Injectable 5000 Unit(s) SubCutaneous every 8 hours  influenza   Vaccine 0.5 milliLiter(s) IntraMuscular once  ketotifen 0.025% Ophthalmic Solution 1 Drop(s) Both EYES two times a day  metoprolol succinate ER 25 milliGRAM(s) Oral daily  polyethylene glycol 3350 17 Gram(s) Oral two times a day  senna 2 Tablet(s) Oral at bedtime  sodium chloride 0.9%. 1000 milliLiter(s) IV Continuous <Continuous>    Antimicrobials:   azithromycin   Tablet 500 milliGRAM(s) Oral daily  aztreonam  IVPB 500 milliGRAM(s) IV Intermittent every 8 hours    Immunologic: influenza   Vaccine 0.5 milliLiter(s) IntraMuscular once    
HPI:  89M CLL (not on treatment), AAA s/p repair, R kidney removal 2/2 kidney CA  p/w weakness, fatigue and fall x 4 days. Seen by PCP yesterday who said pt possibly dehydrated, started pt on treatment for PNA (levaquin),  Pt fell out of bed unwitnessed and while being assisted to bathroom temporarily lost consciousness for one minute witnessed by family w/o further trauma.  Per family pt feels very unwell. Denies fevers, chills, abdominal pain, dysuria, melena or hematochezia.  (30 Dec 2020 09:28)    PERTINENT PM/SXH:   No pertinent past medical history        FAMILY HISTORY:    ITEMS NOT CHECKED ARE NOT PRESENT    SOCIAL HISTORY:   Significant other/partner[ ]  Children[ ]  Pentecostal/Spirituality:  Substance hx:  [ ]   Tobacco hx:  [x ]   Alcohol hx: [ ]   Home Opioid hx:  [ ] I-Stop Reference No:  Living Situation: [ x]Home  [ ]Long term care  [ ]Rehab [ ]Other    ADVANCE DIRECTIVES:    DNR  MOLST  [ ]  Living Will  [ ]   DECISION MAKER(s):  [ ] Health Care Proxy(s)  [x ] Surrogate(s)  [ ] Guardian           Name(s): Phone Number(s):    BASELINE (I)ADL(s) (prior to admission):  [place]: [ ]Total  [ ] Moderate [ ]Dependent    Allergies    No Known Allergies    Intolerances    MEDICATIONS  (STANDING):  amLODIPine   Tablet 5 milliGRAM(s) Oral daily  aspirin  chewable 81 milliGRAM(s) Oral daily  atorvastatin 20 milliGRAM(s) Oral at bedtime  donepezil 5 milliGRAM(s) Oral at bedtime  finasteride 5 milliGRAM(s) Oral daily  heparin   Injectable 5000 Unit(s) SubCutaneous every 8 hours  hydrALAZINE 10 milliGRAM(s) Oral two times a day  influenza   Vaccine 0.5 milliLiter(s) IntraMuscular once  ketotifen 0.025% Ophthalmic Solution 1 Drop(s) Both EYES two times a day  metoprolol succinate ER 50 milliGRAM(s) Oral two times a day  piperacillin/tazobactam IVPB.. 3.375 Gram(s) IV Intermittent every 8 hours  polyethylene glycol 3350 17 Gram(s) Oral two times a day  senna 2 Tablet(s) Oral at bedtime  sodium chloride 0.9%. 1000 milliLiter(s) (80 mL/Hr) IV Continuous <Continuous>    MEDICATIONS  (PRN):  guaiFENesin   Syrup  (Sugar-Free) 200 milliGRAM(s) Oral every 6 hours PRN Cough    PRESENT SYMPTOMS: [ ]Unable to obtain due to poor mentation   Source if other than patient:  [ ]Family   [ ]Team     Pain: [ ]yes [x ]no  QOL impact -   Location -                    Aggravating factors -  Quality -  Radiation -  Timing-  Severity (0-10 scale):  Minimal acceptable level (0-10 scale):     PAIN AD Score:     http://geriatrictoolkit.[facility]/cog/painad.pdf (press ctrl +  left click to view)    Dyspnea:                           [ ]Mild [ ]Moderate [ ]Severe  Anxiety:                             [ ]Mild [ ]Moderate [ ]Severe  Fatigue:                             [ ]Mild [ ]Moderate [ ]Severe  Nausea:                             [ ]Mild [ ]Moderate [ ]Severe  Loss of appetite:              [ ]Mild [ x]Moderate [ ]Severe  Constipation:                    [ ]Mild [ ]Moderate [ ]Severe    Other Symptoms: Cough  [ ]All other review of systems negative     Palliative Performance Status Version 2:      40   %    http://npcrc.org/files/news/palliative_performance_scale_ppsv2.pdf  PHYSICAL EXAM:  Vital Signs Last 24 Hrs  T(C): 36.6 (04 Jan 2021 04:24), Max: 36.6 (04 Jan 2021 04:24)  T(F): 97.9 (04 Jan 2021 04:24), Max: 97.9 (04 Jan 2021 04:24)  HR: 72 (04 Jan 2021 04:24) (67 - 79)  BP: 166/78 (04 Jan 2021 04:24) (152/89 - 190/80)  BP(mean): 72 (04 Jan 2021 04:24) (72 - 72)  RR: 18 (04 Jan 2021 04:24) (18 - 18)  SpO2: 96% (04 Jan 2021 04:24) (96% - 98%) I&O's Summary    03 Jan 2021 07:01  -  04 Jan 2021 07:00  --------------------------------------------------------  IN: 2130 mL / OUT: 1350 mL / NET: 780 mL    04 Jan 2021 07:01  -  04 Jan 2021 11:33  --------------------------------------------------------  IN: 180 mL / OUT: 0 mL / NET: 180 mL      GENERAL:  [x ]Alert  [x ]Oriented x  3  [ ]Lethargic  [ ]Cachexia  [ ]Unarousable  [ ]Verbal  [ ]Non-Verbal  Behavioral:   [ ] Anxiety  [ ] Delirium [ ] Agitation [x ] Other Calm  HEENT:  [x ]Normal   [ ]Dry mouth   [ ]ET Tube/Trach  [ ]Oral lesions  PULMONARY:  No tachypnea  [  ]Clear [ ]Tachypnea  [ ]Audible excessive secretions   [ ]Rhonchi        [ ]Right [ ]Left [ ]Bilateral  [ ]Crackles        [ ]Right [ ]Left [ ]Bilateral  [ ]Wheezing     [ ]Right [ ]Left [ ]Bilatera  [ ]Diminished breath sounds [ ]right [ ]left [ ]bilateral  CARDIOVASCULAR:  No JVD  [  ]Regular [ ]Irregular [ ]Tachy  [ ]Michael [ ]Murmur [ ]Other  GASTROINTESTINAL: Not distended  [  ]Soft  [ ]Distended   [ ]+BS  [x ]Non tender [ ]Tender  [ ]PEG [ ]OGT/ NGT  Last BM:     GENITOURINARY:  [ x]Normal [ ] Incontinent   [ ]Oliguria/Anuria   [ ]Reyes  MUSCULOSKELETAL:   [x ]Normal   [ ]Weakness  [ ]Bed/Wheelchair bound [ ]Edema  NEUROLOGIC:   [ x]No focal deficits  [ ]Cognitive impairment  [ ]Dysphagia [ ]Dysarthria [ ]Paresis [ ]Other   SKIN:   [x ]Normal    [ ]Rash  [ ]Pressure ulcer(s)       Present on admission [ ]y [ ]n    CRITICAL CARE:  [ ] Shock Present  [ ]Septic [ ]Cardiogenic [ ]Neurologic [ ]Hypovolemic  [ ]  Vasopressors [ ]  Inotropes   [ ]Respiratory failure present [ ]Mechanical ventilation [ ]Non-invasive ventilatory support [ ]High flow  [ ]Acute  [ ]Chronic [ ]Hypoxic  [ ]Hypercarbic [ ]Other  [ ]Other organ failure     LABS:                        9.0    7.77  )-----------( 98       ( 04 Jan 2021 02:52 )             27.3   01-04    138  |  110<H>  |  14  ----------------------------<  92  3.6   |  20<L>  |  1.23    Ca    7.6<L>      04 Jan 2021 02:52  Phos  2.0     01-04  Mg     1.8     01-04          RADIOLOGY & ADDITIONAL STUDIES:    PROTEIN CALORIE MALNUTRITION PRESENT: [ ]mild [ ]moderate [ ]severe [ ]underweight [ ]morbid obesity  https://www.andeal.org/vault/2440/web/files/ONC/Table_Clinical%20Characteristics%20to%20Document%20Malnutrition-White%20JV%20et%20al%202012.pdf    Height (cm): 188 (12-31-20 @ 13:35)  Weight (kg): 69 (12-31-20 @ 13:35)  BMI (kg/m2): 19.5 (12-31-20 @ 13:35)    [ ]PPSV2 < or = to 30% [ ]significant weight loss  [ ]poor nutritional intake  [ ]anasarca     Albumin, Serum: 3.3 g/dL (12-29-20 @ 23:21)   [ ]Artificial Nutrition      REFERRALS:   [ ]Chaplaincy  [ ]Hospice  [ ]Child Life  [ ]Social Work  [ ]Case management [ ]Holistic Therapy     Goals of Care Document:

## 2021-01-04 NOTE — PROGRESS NOTE ADULT - SUBJECTIVE AND OBJECTIVE BOX
[facility], Division of Infectious Diseases  ARLIN Nguyen Y. Patel, S. Shah  [phone]  ([phone] - weekdays after 5pm and weekends)    Name: NICOLE SORIANO  Age/Gender: 89y Male  MRN: [id number]    Interval History:  Patient with intermittent coughing, no new complaints,   Denies fever, chills, chest pain, abd pain, n/v/d.   ROS reviewed, pertinent positives and negatives as above.   Notes reviewed. Afebrile.     Allergies: No Known Allergies ?rash with CTX    Objective:  Vitals:   T(F): 97.9 (01-04-21 @ 04:24), Max: 97.9 (01-04-21 @ 04:24)  HR: 72 (01-04-21 @ 04:24) (67 - 79)  BP: 166/78 (01-04-21 @ 04:24) (152/89 - 190/80)  RR: 18 (01-04-21 @ 04:24) (18 - 18)  SpO2: 96% (01-04-21 @ 04:24) (96% - 98%)    Physical Examination:  General: no acute distress  HEENT: NC/AT, EOMI, anicteric, neck supple  Cardio: S1, S2 heard, RRR, no murmurs  Resp: dec breath sounds bilaterally  Abd: soft, NT, ND, + BS, suprapubic catheter  Neuro: AAOxself, place and person, no obvious focal deficits  Ext: no edema or cyanosis, moving extremities  Skin: warm, dry, no visible rash, bruising on UE  Psych: appropriate affect and mood for situation  Lines: PIV    Laboratory Studies:  CBC:                       9.0    7.77  )-----------( 98       ( 04 Jan 2021 02:52 )             27.3     CMP: 01-04    138  |  110<H>  |  14  ----------------------------<  92  3.6   |  20<L>  |  1.23    Ca    7.6<L>      04 Jan 2021 02:52  Phos  2.0     01-04  Mg     1.8     01-04    Microbiology:  12/31 - urine culture - negative  12/31 - COVID-19 ab - negative  12/31 - Legionella ur ag - negative    12/30 - blood cultures - NGTD x2  12/29 - COVID-19 PCR - negative     Radiology:  US Kidney and Bladder (12.31.20 @ 15:46) > IMPRESSION:  Status post right nephrectomy. No hydronephrosis of the left kidney. Increased cortical echogenicity of the left kidney. Multiple left renal cysts, including a cyst with multiple septations in the lower pole of the left kidney. Distended urinary bladder with moderate amount of debris within the bladder. Correlation with urinalysis is suggested. Trabeculated bladder wall with multiple diverticuli. Enlarged prostate gland.    CT Chest No Cont (12.30.20 @ 10:28) >IMPRESSION: Bronchial wall thickening in the right middle and lower lobe with associated peribronchovascular consolidation. This may be due to infection or possibly aspiration in the right clinical setting due to its location and appearance. Correlate with aspiration history.  Enlarged subcarinal lymph measuring up to 1.5 cm, nonspecific  Additional foci of patchy ground glass opacity in the right upper lobe are indeterminate. The differential for this includes infection or  malignancy. If priors are available, they should be submitted to assess for change. If none are available, consider follow-up CT chest in one month.    Medications:  MEDICATIONS  (STANDING):  amLODIPine   Tablet 5 milliGRAM(s) Oral daily  aspirin  chewable 81 milliGRAM(s) Oral daily  atorvastatin 20 milliGRAM(s) Oral at bedtime  donepezil 5 milliGRAM(s) Oral at bedtime  finasteride 5 milliGRAM(s) Oral daily  heparin   Injectable 5000 Unit(s) SubCutaneous every 8 hours  hydrALAZINE 10 milliGRAM(s) Oral two times a day  influenza   Vaccine 0.5 milliLiter(s) IntraMuscular once  ketotifen 0.025% Ophthalmic Solution 1 Drop(s) Both EYES two times a day  metoprolol succinate ER 50 milliGRAM(s) Oral two times a day  piperacillin/tazobactam IVPB.. 3.375 Gram(s) IV Intermittent every 8 hours  polyethylene glycol 3350 17 Gram(s) Oral two times a day  senna 2 Tablet(s) Oral at bedtime  sodium chloride 0.9%. 1000 milliLiter(s) (80 mL/Hr) IV Continuous <Continuous>      Antimicrobials:  piperacillin/tazobactam IVPB.. 3.375 Gram(s) IV Intermittent every 8 hours - started 12/31  s/p azithromycin 12/30  s/p aztreonam 12/31  s/p ceftriaxone 12/30

## 2021-01-04 NOTE — CONSULT NOTE ADULT - CONSULT REQUESTED DATE/TIME
01-Jan-2021 10:16
01-Jan-2021 15:25
04-Jan-2021 12:36
04-Jan-2021 12:38
31-Dec-2020 15:31
04-Jan-2021 00:00

## 2021-01-04 NOTE — CONSULT NOTE ADULT - PROBLEM SELECTOR RECOMMENDATION 3
likely due to dehydration and poor oral intake, renally adjusted antibiotics as above. Management per primary team
Current functional PPSv2: 40  Nursing care required:  Code status documented: CPR  A review of the paper chart has been conducted  HCP form present:               Yes and valid [x]               Yes but invalid []                No []   MOLST present:                   Yes and valid [x]    CPR filled out by his health care proxy           Yes but invalid []                No []  Incapacity form present:   Yes and valid []                Yes but invalid []                No []                 N/A []  Living Will:                            Yes and valid []                Yes but invalid []                No []      Family Health Care Decision Act (FHCDA) Surrogate Decision Maker Hierarchy in the absence of a health care agent  [facility] Article [address] Guardian-->Spouse or domestic partner--> Adult child-->Parent-->Sibling--> Close friend

## 2021-01-04 NOTE — PROGRESS NOTE ADULT - PROBLEM SELECTOR PLAN 1
u/a+  f/u UCX  ID changed abx to zosyn for double coverage UTI and pna, till 1/7  monitor for worsening rash

## 2021-01-04 NOTE — PROGRESS NOTE ADULT - SUBJECTIVE AND OBJECTIVE BOX
Patient is a 89y old  Male who presents with a chief complaint of I fell (04 Jan 2021 12:36)      INTERVAL HPI/OVERNIGHT EVENTS: noted  pt seen and examined  21 beats of wct  c/o cough with eating        Vital Signs Last 24 Hrs  T(C): 36.3 (04 Jan 2021 12:59), Max: 36.6 (04 Jan 2021 04:24)  T(F): 97.3 (04 Jan 2021 12:59), Max: 97.9 (04 Jan 2021 04:24)  HR: 71 (04 Jan 2021 12:59) (70 - 79)  BP: 169/86 (04 Jan 2021 12:59) (152/89 - 190/80)  BP(mean): 72 (04 Jan 2021 04:24) (72 - 72)  RR: 18 (04 Jan 2021 12:59) (18 - 18)  SpO2: 99% (04 Jan 2021 12:59) (96% - 99%)    amLODIPine   Tablet 5 milliGRAM(s) Oral daily  aspirin  chewable 81 milliGRAM(s) Oral daily  atorvastatin 20 milliGRAM(s) Oral at bedtime  calcium gluconate IVPB 1 Gram(s) IV Intermittent once  donepezil 5 milliGRAM(s) Oral at bedtime  finasteride 5 milliGRAM(s) Oral daily  guaiFENesin   Syrup  (Sugar-Free) 200 milliGRAM(s) Oral every 6 hours PRN  heparin   Injectable 5000 Unit(s) SubCutaneous every 8 hours  hydrALAZINE 10 milliGRAM(s) Oral two times a day  influenza   Vaccine 0.5 milliLiter(s) IntraMuscular once  ketotifen 0.025% Ophthalmic Solution 1 Drop(s) Both EYES two times a day  metoprolol succinate ER 50 milliGRAM(s) Oral two times a day  piperacillin/tazobactam IVPB.. 3.375 Gram(s) IV Intermittent every 8 hours  polyethylene glycol 3350 17 Gram(s) Oral two times a day  potassium chloride  10 mEq/100 mL IVPB 10 milliEquivalent(s) IV Intermittent every 1 hour  senna 2 Tablet(s) Oral at bedtime      PHYSICAL EXAM:  GENERAL: NAD,   EYES: conjunctiva and sclera clear  ENMT: Moist mucous membranes  NECK: Supple, No JVD, Normal thyroid  CHEST/LUNG: non labored, cta b/l  HEART: Regular rate and rhythm; No murmurs, rubs, or gallops  ABDOMEN: Soft, Nontender, Nondistended; Bowel sounds present  EXTREMITIES:  2+ Peripheral Pulses, No clubbing, cyanosis, or edema  LYMPH: No lymphadenopathy noted  SKIN: No rashes or lesions    Consultant(s) Notes Reviewed:  [x ] YES  [ ] NO  Care Discussed with Consultants/Other Providers [ x] YES  [ ] NO    LABS:                        9.0    7.77  )-----------( 98       ( 04 Jan 2021 02:52 )             27.3     01-04    139  |  112<H>  |  10  ----------------------------<  93  3.2<L>   |  20<L>  |  1.09    Ca    6.2<LL>      04 Jan 2021 12:13  Phos  2.0     01-04  Mg     1.6     01-04          CAPILLARY BLOOD GLUCOSE                  RADIOLOGY & ADDITIONAL TESTS:    Imaging Personally Reviewed:  [x ] YES  [ ] NO

## 2021-01-04 NOTE — CONSULT NOTE ADULT - ASSESSMENT
89 year old man with history of CLL (not on treatment), AAA s/p repair, R kidney removal 2/2 kidney CA admitted with weakness, fatigue and fall x 4 days Found to have +orthostatics and UTI being treated with zosyn. EP consulted for 21 beats of WCT .     89 year old man with history of CLL (not on treatment), AAA s/p repair, R kidney removal 2/2 kidney CA admitted with weakness, fatigue and fall x 4 days Found to have +orthostatics and UTI being treated with zosyn. EP consulted for 21 beats of WCT .    -would increase metoprolol to 75mg BID  -tele with 10 seconds of NSVT- appears to be originating in the RVOT. Occasionally has episodes of non-sustained atrial tachycardia both asymptomatic  no further work up necessary at this time  -would recommend correcting electrolyte abnormalities (K>4, Mg>2) and repleting Ca  -continue GOC discussion with family

## 2021-01-04 NOTE — CONSULT NOTE ADULT - PROBLEM SELECTOR RECOMMENDATION 4
Discussed the idea of code status with the patient. He expressed that it was never something discussed with his HCPs (both of which are his nieces).  He was unsure about what he wanted. He was encouraged to discuss this with his nieces so that they can have a better idea about his wishes. The patient has a decision making style where he communicates these things to them and they either make decisions for him or jointly.  He is not a candidate for the PCU

## 2021-01-04 NOTE — CHART NOTE - NSCHARTNOTESELECT_GEN_ALL_CORE
hypertension/Event Note
-  Arrhythmia/Event Note
Event Note
arrythmia/Event Note

## 2021-01-04 NOTE — CONSULT NOTE ADULT - PROBLEM SELECTOR RECOMMENDATION 2
On dysphagia diet  Recent s/s evaluation recommended NPO  He is on a dysphagia diet  When discussed today, the patient stated that he does not want a PEG
possible aspiration pneumonia.  Patient with nonproductive cough for about 2 weeks, no other complaints.   COVID-19 PCR negative   CT chest reported bronchial wall thickening RML and RLL with associated peribronchovascular consolidation  S/p ceftriaxone and azithromycin in the ER  Now on aztreonam and azithromycin  NPO due to aspiration risk, s/p S&S eval - scheduled for MBS 1/2   Afebrile, no leukocytosis    Follow blood cultures  Follow for Legionella urine antigen  Discontinue aztreonam  Aspiration precautions   Start on pip-tazo as above  Continue on azithromycin for now - discontinue if Legionella urine ag is negative

## 2021-01-04 NOTE — PROVIDER CONTACT NOTE (CRITICAL VALUE NOTIFICATION) - ACTION/TREATMENT ORDERED:
Detail Level: Zone
Continue Regimen: CeraVe cream daily
Discontinue Regimen: Neosporin eczema cream
PA notified and aware, metabolic ordered for 1700, continue to monitor

## 2021-01-04 NOTE — CONSULT NOTE ADULT - PROBLEM SELECTOR RECOMMENDATION 9
No odynophagia  No nausea  No vomiting  some issues with food preference  Likely due to acute illness (PNA)

## 2021-01-04 NOTE — CONSULT NOTE ADULT - CONSULT REASON
21 beats of WCT
renal cancer, urethral stricture, retention urine
syncope
syncope
UTI, pneumonia
consulted for assistance with medical decision making in the context of a patient with dysphagia

## 2021-01-04 NOTE — CHART NOTE - NSCHARTNOTEFT_GEN_A_CORE
PA NOTE:    RN notified pt w/ 21 beats of WCT vs. aberrancy w/ HR of 129 while asleep - asymptomatic.  Pt seen at bedside, awoken from sleep- denies chest pain, palpations, nausea, abdominal pain, headache.     Vital Signs Last 24 Hrs  T(C): 36.4 (03 Jan 2021 20:58), Max: 36.4 (03 Jan 2021 04:35)  T(F): 97.6 (03 Jan 2021 20:58), Max: 97.6 (03 Jan 2021 20:58)  HR: 70 (04 Jan 2021 02:10) (67 - 79)  BP: 152/89 (04 Jan 2021 02:10) (152/89 - 190/80)  RR: 18 (04 Jan 2021 02:10) (18 - 18)  SpO2: 97% (04 Jan 2021 02:10) (97% - 98%)    Physical Exam:  CV:  +S1 and S2.    Pulm:  diminished bilaterally.    GI:  Soft, NT.    Assessment: 89M CLL (not on treatment), AAA s/p repair, R kidney removal 2/2 kidney CA  p/w weakness, fatigue and fall x 4 days. Seen by PCP yesterday who said pt possibly dehydrated, started pt on treatment for PNA (levaquin),  Pt fell out of bed unwitnessed and while being assisted to bathroom temporarily lost consciousness for one minute witnessed by family w/o further trauma.  Per family pt feels very unwell. Denies fevers, chills, abdominal pain, dysuria, melena or hematochezia.  (30 Dec 2020 09:28)      Now, patient has 21 beats of WCT- asymptomatic.   Pt currently  is on sinus rhythm w/ HR 70's.       Plan:   - STAT Mg, Phosp, BMP   - K>4, Mg>2, Phos>3  - AM Metoprolol given   - Cards is following   - Monitor on telemetry   - Will endorse to day team and attending to follow   - Will discuss with Dr. Roger Salazar  Dept of Medicine   #[id number] PA NOTE:    RN notified pt w/ 21 beats of WCT vs. aberrancy w/ HR of 129 while asleep - asymptomatic. Previously had 11 beats on 12/31  Pt seen at bedside, awoken from sleep- denies chest pain, palpations, nausea, abdominal pain, headache.     Vital Signs Last 24 Hrs  T(C): 36.4 (03 Jan 2021 20:58), Max: 36.4 (03 Jan 2021 04:35)  T(F): 97.6 (03 Jan 2021 20:58), Max: 97.6 (03 Jan 2021 20:58)  HR: 70 (04 Jan 2021 02:10) (67 - 79)  BP: 152/89 (04 Jan 2021 02:10) (152/89 - 190/80)  RR: 18 (04 Jan 2021 02:10) (18 - 18)  SpO2: 97% (04 Jan 2021 02:10) (97% - 98%)    Physical Exam:  CV:  +S1 and S2.    Pulm:  diminished bilaterally.    GI:  Soft, NT.    Assessment: 89M CLL (not on treatment), AAA s/p repair, R kidney removal 2/2 kidney CA  p/w weakness, fatigue and fall x 4 days. Seen by PCP yesterday who said pt possibly dehydrated, started pt on treatment for PNA (levaquin),  Pt fell out of bed unwitnessed and while being assisted to bathroom temporarily lost consciousness for one minute witnessed by family w/o further trauma.  Per family pt feels very unwell. Denies fevers, chills, abdominal pain, dysuria, melena or hematochezia.  (30 Dec 2020 09:28)      Now, patient has 21 beats of WCT- asymptomatic.   Pt currently  is on sinus rhythm w/ HR 70's.       Plan:   - STAT Mg, Phosp, BMP   - K>4, Mg>2, Phos>3  - AM Metoprolol given   - Cards is following   - Monitor on telemetry   - Will endorse to day team and attending to follow   - Will discuss with Dr. Roger Salazar  Dept of Medicine   #[id number]

## 2021-01-05 LAB
24R-OH-CALCIDIOL SERPL-MCNC: 52.5 NG/ML — SIGNIFICANT CHANGE UP (ref 30–80)
ALBUMIN SERPL ELPH-MCNC: 2.3 G/DL — LOW (ref 3.3–5)
ALP SERPL-CCNC: 54 U/L — SIGNIFICANT CHANGE UP (ref 40–120)
ALT FLD-CCNC: 7 U/L — LOW (ref 10–45)
ANION GAP SERPL CALC-SCNC: 7 MMOL/L — SIGNIFICANT CHANGE UP (ref 5–17)
AST SERPL-CCNC: 14 U/L — SIGNIFICANT CHANGE UP (ref 10–40)
BILIRUB SERPL-MCNC: 0.5 MG/DL — SIGNIFICANT CHANGE UP (ref 0.2–1.2)
BUN SERPL-MCNC: 10 MG/DL — SIGNIFICANT CHANGE UP (ref 7–23)
CA-I BLD-SCNC: 1.17 MMOL/L — SIGNIFICANT CHANGE UP (ref 1.12–1.3)
CALCIUM SERPL-MCNC: 8 MG/DL — LOW (ref 8.4–10.5)
CHLORIDE SERPL-SCNC: 105 MMOL/L — SIGNIFICANT CHANGE UP (ref 96–108)
CO2 SERPL-SCNC: 23 MMOL/L — SIGNIFICANT CHANGE UP (ref 22–31)
CREAT SERPL-MCNC: 1.25 MG/DL — SIGNIFICANT CHANGE UP (ref 0.5–1.3)
GLUCOSE SERPL-MCNC: 105 MG/DL — HIGH (ref 70–99)
MAGNESIUM SERPL-MCNC: 2.2 MG/DL — SIGNIFICANT CHANGE UP (ref 1.6–2.6)
PHOSPHATE SERPL-MCNC: 2.1 MG/DL — LOW (ref 2.5–4.5)
POTASSIUM SERPL-MCNC: 4.1 MMOL/L — SIGNIFICANT CHANGE UP (ref 3.5–5.3)
POTASSIUM SERPL-SCNC: 4.1 MMOL/L — SIGNIFICANT CHANGE UP (ref 3.5–5.3)
PROT SERPL-MCNC: 5.9 G/DL — LOW (ref 6–8.3)
SODIUM SERPL-SCNC: 135 MMOL/L — SIGNIFICANT CHANGE UP (ref 135–145)

## 2021-01-05 PROCEDURE — 99233 SBSQ HOSP IP/OBS HIGH 50: CPT

## 2021-01-05 PROCEDURE — 99358 PROLONG SERVICE W/O CONTACT: CPT

## 2021-01-05 RX ORDER — AMLODIPINE BESYLATE 2.5 MG/1
10 TABLET ORAL DAILY
Refills: 0 | Status: DISCONTINUED | OUTPATIENT
Start: 2021-01-05 | End: 2021-01-07

## 2021-01-05 RX ADMIN — SENNA PLUS 2 TABLET(S): 8.6 TABLET ORAL at 21:49

## 2021-01-05 RX ADMIN — Medication 50 MILLIGRAM(S): at 05:26

## 2021-01-05 RX ADMIN — Medication 62.5 MILLIMOLE(S): at 17:04

## 2021-01-05 RX ADMIN — KETOTIFEN FUMARATE 1 DROP(S): 0.34 SOLUTION OPHTHALMIC at 05:26

## 2021-01-05 RX ADMIN — HEPARIN SODIUM 5000 UNIT(S): 5000 INJECTION INTRAVENOUS; SUBCUTANEOUS at 21:50

## 2021-01-05 RX ADMIN — ATORVASTATIN CALCIUM 20 MILLIGRAM(S): 80 TABLET, FILM COATED ORAL at 21:50

## 2021-01-05 RX ADMIN — AMLODIPINE BESYLATE 10 MILLIGRAM(S): 2.5 TABLET ORAL at 13:12

## 2021-01-05 RX ADMIN — Medication 10 MILLIGRAM(S): at 05:24

## 2021-01-05 RX ADMIN — PIPERACILLIN AND TAZOBACTAM 25 GRAM(S): 4; .5 INJECTION, POWDER, LYOPHILIZED, FOR SOLUTION INTRAVENOUS at 21:49

## 2021-01-05 RX ADMIN — Medication 10 MILLIGRAM(S): at 19:54

## 2021-01-05 RX ADMIN — DONEPEZIL HYDROCHLORIDE 5 MILLIGRAM(S): 10 TABLET, FILM COATED ORAL at 21:50

## 2021-01-05 RX ADMIN — PIPERACILLIN AND TAZOBACTAM 25 GRAM(S): 4; .5 INJECTION, POWDER, LYOPHILIZED, FOR SOLUTION INTRAVENOUS at 17:03

## 2021-01-05 RX ADMIN — Medication 50 MILLIGRAM(S): at 19:54

## 2021-01-05 RX ADMIN — KETOTIFEN FUMARATE 1 DROP(S): 0.34 SOLUTION OPHTHALMIC at 19:54

## 2021-01-05 RX ADMIN — PIPERACILLIN AND TAZOBACTAM 25 GRAM(S): 4; .5 INJECTION, POWDER, LYOPHILIZED, FOR SOLUTION INTRAVENOUS at 05:26

## 2021-01-05 RX ADMIN — Medication 81 MILLIGRAM(S): at 13:06

## 2021-01-05 RX ADMIN — FINASTERIDE 5 MILLIGRAM(S): 5 TABLET, FILM COATED ORAL at 13:06

## 2021-01-05 RX ADMIN — HEPARIN SODIUM 5000 UNIT(S): 5000 INJECTION INTRAVENOUS; SUBCUTANEOUS at 05:26

## 2021-01-05 RX ADMIN — HEPARIN SODIUM 5000 UNIT(S): 5000 INJECTION INTRAVENOUS; SUBCUTANEOUS at 13:06

## 2021-01-05 RX ADMIN — AMLODIPINE BESYLATE 5 MILLIGRAM(S): 2.5 TABLET ORAL at 05:25

## 2021-01-05 NOTE — DIETITIAN INITIAL EVALUATION ADULT. - OTHER INFO
Patient states he has no appetite currently; however, patient is A&Ox2-3 and a questionable historian. Discussed case with RN, RN reports patient consumed ~50% of meals on 1/4 and possibly dislikes the taste/texture of current diet consistency. Per chart review, patient complains of coughing while eating. Patient with failed MBS on 1/2; however, per chart review family does not wish for tube feeds and patient resumed dysphagia 1 honey-thick diet for pleasure feeds on 1/2. NKFA per chart. Patient unable to answer questions about GI distress (nausea/vomiting/diarrhea/constipation).    Patient unable to answer questions about PO intake and weight changes, and no previous weights noted in chart.

## 2021-01-05 NOTE — PROGRESS NOTE ADULT - PROBLEM SELECTOR PLAN 1
UA with WBC 2037, large LE, negative nitrite, few bacteria.  Symptomatic - complained of dysuria and earlier reported suprapubic pain on admission. Has h/o CLL not on treatment.   S/p ceftriaxone in the ER, then was on aztreonam due to c/f rash with CTX  Patient reports no antibiotic allergies, tolerating pip-tazo with no reaction noted  Urine culture resulted negative, had taken levofloxacin prior to admission.   Blood cultures NGTD  Urology following, now s/p suprapubic catheter placement 1/2  Afebrile, no leukocytosis.   On Pip-tazo 3.375g IV Q8h (renally adjusted) - completes 7 day course today 1/5

## 2021-01-05 NOTE — PROVIDER CONTACT NOTE (OTHER) - SITUATION
Notified by tele tech that patient had PAT first time up to 140s for 6 seconds. Notified by tele tech that patient had PAT up to 140s for 6 seconds.

## 2021-01-05 NOTE — PROGRESS NOTE ADULT - SUBJECTIVE AND OBJECTIVE BOX
Pt. sleeping comfortably and not awoken.  Had non-sustained tachy, wide and narrow.    Telemetry -  Vital Signs Last 24 Hrs  T(C): 36.6 (05 Jan 2021 09:09), Max: 36.6 (05 Jan 2021 04:25)  T(F): 97.9 (05 Jan 2021 09:09), Max: 97.9 (05 Jan 2021 04:25)  HR: 70 (05 Jan 2021 09:09) (65 - 87)  BP: 160/80 (05 Jan 2021 09:09) (154/83 - 169/86)  BP(mean): --  RR: 18 (05 Jan 2021 09:09) (17 - 18)  SpO2: 96% (05 Jan 2021 09:09) (96% - 99%)    I&O's Summary    04 Jan 2021 07:01  -  05 Jan 2021 07:00  --------------------------------------------------------  IN: 500 mL / OUT: 1200 mL / NET: -700 mL    05 Jan 2021 07:01  -  05 Jan 2021 11:22  --------------------------------------------------------  IN: 60 mL / OUT: 0 mL / NET: 60 mL        PHYSICAL EXAM:  GENERAL:asleep, NAD.  NECK: Supple, No JVD, no carotid bruit.  CHEST/LUNG: grossly Clear to auscultation bilaterally;  HEART: S1 S2 normal, no changes  ABDOMEN: Soft, Nontender, Nondistended; Bowel sounds present  EXTREMITIES:  1LE edema.      LABS:                        9.0    7.77  )-----------( 98       ( 04 Jan 2021 02:52 )             27.3     01-05    135  |  105  |  10  ----------------------------<  105<H>  4.1   |  23  |  1.25    Ca    8.0<L>      05 Jan 2021 07:06  Phos  2.0     01-04  Mg     2.2     01-05    TPro  5.9<L>  /  Alb  2.3<L>  /  TBili  0.5  /  DBili  x   /  AST  14  /  ALT  7<L>  /  AlkPhos  54  01-05                  MEDICATIONS  (STANDING):  amLODIPine   Tablet 5 milliGRAM(s) Oral daily  aspirin  chewable 81 milliGRAM(s) Oral daily  atorvastatin 20 milliGRAM(s) Oral at bedtime  donepezil 5 milliGRAM(s) Oral at bedtime  finasteride 5 milliGRAM(s) Oral daily  heparin   Injectable 5000 Unit(s) SubCutaneous every 8 hours  hydrALAZINE 10 milliGRAM(s) Oral two times a day  influenza   Vaccine 0.5 milliLiter(s) IntraMuscular once  ketotifen 0.025% Ophthalmic Solution 1 Drop(s) Both EYES two times a day  metoprolol succinate ER 50 milliGRAM(s) Oral two times a day  piperacillin/tazobactam IVPB.. 3.375 Gram(s) IV Intermittent every 8 hours  polyethylene glycol 3350 17 Gram(s) Oral two times a day  senna 2 Tablet(s) Oral at bedtime    MEDICATIONS  (PRN):  guaiFENesin   Syrup  (Sugar-Free) 200 milliGRAM(s) Oral every 6 hours PRN Cough      RADIOLOGY & ADDITIONAL TESTS:

## 2021-01-05 NOTE — PROGRESS NOTE ADULT - SUBJECTIVE AND OBJECTIVE BOX
HPI:  89M CLL (not on treatment), AAA s/p repair, R kidney removal 2/2 kidney CA  p/w weakness, fatigue and fall x 4 days. Seen by PCP yesterday who said pt possibly dehydrated, started pt on treatment for PNA (levaquin),  Pt fell out of bed unwitnessed and while being assisted to bathroom temporarily lost consciousness for one minute witnessed by family w/o further trauma.  Per family pt feels very unwell. Denies fevers, chills, abdominal pain, dysuria, melena or hematochezia.  (30 Dec 2020 09:28)    PERTINENT PM/SXH:   No pertinent past medical history        FAMILY HISTORY:    ITEMS NOT CHECKED ARE NOT PRESENT    SOCIAL HISTORY:   Significant other/partner[ ]  Children[ ]  Zoroastrian/Spirituality:  Substance hx:  [ ]   Tobacco hx:  [x ]   Alcohol hx: [ ]   Home Opioid hx:  [ ] I-Stop Reference No:  Living Situation: [ x]Home  [ ]Long term care  [ ]Rehab [ ]Other    ADVANCE DIRECTIVES:    DNR  MOLST  [ ]  Living Will  [ ]   DECISION MAKER(s):  [ ] Health Care Proxy(s)  [x ] Surrogate(s)  [ ] Guardian           Name(s): Phone Number(s):    BASELINE (I)ADL(s) (prior to admission):  [place]: [ ]Total  [ ] Moderate [ ]Dependent    Allergies    No Known Allergies    Intolerances    MEDICATIONS  (STANDING):  amLODIPine   Tablet 5 milliGRAM(s) Oral daily  aspirin  chewable 81 milliGRAM(s) Oral daily  atorvastatin 20 milliGRAM(s) Oral at bedtime  donepezil 5 milliGRAM(s) Oral at bedtime  finasteride 5 milliGRAM(s) Oral daily  heparin   Injectable 5000 Unit(s) SubCutaneous every 8 hours  hydrALAZINE 10 milliGRAM(s) Oral two times a day  influenza   Vaccine 0.5 milliLiter(s) IntraMuscular once  ketotifen 0.025% Ophthalmic Solution 1 Drop(s) Both EYES two times a day  metoprolol succinate ER 50 milliGRAM(s) Oral two times a day  piperacillin/tazobactam IVPB.. 3.375 Gram(s) IV Intermittent every 8 hours  polyethylene glycol 3350 17 Gram(s) Oral two times a day  senna 2 Tablet(s) Oral at bedtime  sodium chloride 0.9%. 1000 milliLiter(s) (80 mL/Hr) IV Continuous <Continuous>    MEDICATIONS  (PRN):  guaiFENesin   Syrup  (Sugar-Free) 200 milliGRAM(s) Oral every 6 hours PRN Cough    PRESENT SYMPTOMS: [ ]Unable to obtain due to poor mentation   Source if other than patient:  [ ]Family   [ ]Team     Pain: [ ]yes [x ]no  QOL impact -   Location -                    Aggravating factors -  Quality -  Radiation -  Timing-  Severity (0-10 scale):  Minimal acceptable level (0-10 scale):     PAIN AD Score:     [email or web address] (press ctrl +  left click to view)    Dyspnea:                           [ ]Mild [ ]Moderate [ ]Severe  Anxiety:                             [ ]Mild [ ]Moderate [ ]Severe  Fatigue:                             [ ]Mild [ ]Moderate [ ]Severe  Nausea:                             [ ]Mild [ ]Moderate [ ]Severe  Loss of appetite:              [ ]Mild [ x]Moderate [ ]Severe  Constipation:                    [ ]Mild [ ]Moderate [ ]Severe    Other Symptoms: Cough  [ ]All other review of systems negative     Palliative Performance Status Version 2:      40   %    http://npcrc.org/files/news/palliative_performance_scale_ppsv2.pdf  PHYSICAL EXAM:  Vital Signs Last 24 Hrs  T(C): 36.6 (04 Jan 2021 04:24), Max: 36.6 (04 Jan 2021 04:24)  T(F): 97.9 (04 Jan 2021 04:24), Max: 97.9 (04 Jan 2021 04:24)  HR: 72 (04 Jan 2021 04:24) (67 - 79)  BP: 166/78 (04 Jan 2021 04:24) (152/89 - 190/80)  BP(mean): 72 (04 Jan 2021 04:24) (72 - 72)  RR: 18 (04 Jan 2021 04:24) (18 - 18)  SpO2: 96% (04 Jan 2021 04:24) (96% - 98%) I&O's Summary    03 Jan 2021 07:01  -  04 Jan 2021 07:00  --------------------------------------------------------  IN: 2130 mL / OUT: 1350 mL / NET: 780 mL    04 Jan 2021 07:01  -  04 Jan 2021 11:33  --------------------------------------------------------  IN: 180 mL / OUT: 0 mL / NET: 180 mL      GENERAL:  [x ]Alert  [x ]Oriented x  3  [ ]Lethargic  [ ]Cachexia  [ ]Unarousable  [ ]Verbal  [ ]Non-Verbal  Behavioral:   [ ] Anxiety  [ ] Delirium [ ] Agitation [x ] Other Calm  HEENT:  [x ]Normal   [ ]Dry mouth   [ ]ET Tube/Trach  [ ]Oral lesions  PULMONARY:  No tachypnea  [  ]Clear [ ]Tachypnea  [ ]Audible excessive secretions   [ ]Rhonchi        [ ]Right [ ]Left [ ]Bilateral  [ ]Crackles        [ ]Right [ ]Left [ ]Bilateral  [ ]Wheezing     [ ]Right [ ]Left [ ]Bilatera  [ ]Diminished breath sounds [ ]right [ ]left [ ]bilateral  CARDIOVASCULAR:  No JVD  [  ]Regular [ ]Irregular [ ]Tachy  [ ]Michael [ ]Murmur [ ]Other  GASTROINTESTINAL: Not distended  [  ]Soft  [ ]Distended   [ ]+BS  [x ]Non tender [ ]Tender  [ ]PEG [ ]OGT/ NGT  Last BM:     GENITOURINARY:  [ x]Normal [ ] Incontinent   [ ]Oliguria/Anuria   [ ]Reyes  MUSCULOSKELETAL:   [x ]Normal   [ ]Weakness  [ ]Bed/Wheelchair bound [ ]Edema  NEUROLOGIC:   [ x]No focal deficits  [ ]Cognitive impairment  [ ]Dysphagia [ ]Dysarthria [ ]Paresis [ ]Other   SKIN:   [x ]Normal    [ ]Rash  [ ]Pressure ulcer(s)       Present on admission [ ]y [ ]n    CRITICAL CARE:  [ ] Shock Present  [ ]Septic [ ]Cardiogenic [ ]Neurologic [ ]Hypovolemic  [ ]  Vasopressors [ ]  Inotropes   [ ]Respiratory failure present [ ]Mechanical ventilation [ ]Non-invasive ventilatory support [ ]High flow  [ ]Acute  [ ]Chronic [ ]Hypoxic  [ ]Hypercarbic [ ]Other  [ ]Other organ failure     LABS:                        9.0    7.77  )-----------( 98       ( 04 Jan 2021 02:52 )             27.3   01-04    138  |  110<H>  |  14  ----------------------------<  92  3.6   |  20<L>  |  1.23    Ca    7.6<L>      04 Jan 2021 02:52  Phos  2.0     01-04  Mg     1.8     01-04          RADIOLOGY & ADDITIONAL STUDIES:    PROTEIN CALORIE MALNUTRITION PRESENT: [ ]mild [ ]moderate [ ]severe [ ]underweight [ ]morbid obesity  https://www.andeal.org/vault/2440/web/files/ONC/Table_Clinical%20Characteristics%20to%20Document%20Malnutrition-White%20JV%20et%20al%202012.pdf    Height (cm): 188 (12-31-20 @ 13:35)  Weight (kg): 69 (12-31-20 @ 13:35)  BMI (kg/m2): 19.5 (12-31-20 @ 13:35)    [ ]PPSV2 < or = to 30% [ ]significant weight loss  [ ]poor nutritional intake  [ ]anasarca     Albumin, Serum: 3.3 g/dL (12-29-20 @ 23:21)   [ ]Artificial Nutrition      REFERRALS:   [ ]Chaplaincy  [ ]Hospice  [ ]Child Life  [ ]Social Work  [ ]Case management [ ]Holistic Therapy     Goals of Care Document:

## 2021-01-05 NOTE — GOALS OF CARE CONVERSATION - ADVANCED CARE PLANNING - CONVERSATION DETAILS
[place]    Topic discussed previously    Yes []      No []    Complexity        Low[]              Medium []      High []       Unknown []    Potential barriers to expeditious decision making:    Objectives defined in premeeting huddle:    Provider:          Content:        Tone:          Summary:       Action Items:      Follow up: [place]: Care plan delineation    Topic discussed previously    Yes [x]      No []    Complexity        Low[]              Medium []      High [x]       Unknown []    Potential barriers to expeditious decision making: TBD    Objectives defined in premeeting huddle: None    Provider:  Kyle Berman        Content:  Discussion with the patient. We agreed that I would speak to his HCP.  An extensive conversation was held.  I provided a contemporary and accurate explanation of palliative medicine.  We discussed the role of palliative medicine in enhancing the quality of life for patients living with serious medical illness, the role of the interdisciplinary team in addressing symptoms, coping, and healthcare navigation, and the impact of palliative care along the illness trajectory. No symptoms were being reported. Chela stated that she is would want a trial of intubation but not want LT mechanical ventilation. Pt is eager to eat without a feeding tube. We discussed substituted decision making. She is inclined to pursue resuscitation at this time. We discussed CPR outcomes and the possibility of readdressing these goals if he becomes more sick so that she reconsiders the current plan.    Tone:  Calm    Summary:   Plan is to discuss code status as an outpatient      Action Items:  Plan is for home hospice according to the mirela      Follow up:  Palliative Medicine Service      The patient's symptoms are well controlled.  The patient's care plan has been delineated.  Thank you for the consult, feel free to reconsult when clinical needs arise      In the event of newly developing, evolving, or worsening symptoms, please contact the Palliative Medicine team via pager (if the patient is at [facility] #[phone] or if the patient is at [facility] #[phone]) The Geriatric and Palliative Medicine service has coverage 24 hours a day/ 7 days a week to provide medical recommendations regarding symptom management needs via telephone        Kyle Berman MD   of Geriatric and Palliative Medicine  [facility]      Please page the following number for clinical matters between the hours of 9 am and 5 pm   from Monday through Friday : [phone]    After 5pm and on weekends, please see the contact information below:    In the event of newly developing, evolving, or worsening symptoms, please contact the Palliative Medicine team via pager (if the patient is at [facility] #[phone] or if the patient is at [facility] #[phone]) The Geriatric and Palliative Medicine service has coverage 24 hours a day/ 7 days a week to provide medical recommendations regarding symptom management needs via telephone

## 2021-01-05 NOTE — PROGRESS NOTE ADULT - SUBJECTIVE AND OBJECTIVE BOX
Patient is a 89y old  Male who presents with a chief complaint of I fell (05 Jan 2021 11:21)      INTERVAL HPI/OVERNIGHT EVENTS: noted  pt seen and examined  tolerated po dysphagia diet      Vital Signs Last 24 Hrs  T(C): 36.8 (05 Jan 2021 13:13), Max: 36.8 (05 Jan 2021 13:13)  T(F): 98.3 (05 Jan 2021 13:13), Max: 98.3 (05 Jan 2021 13:13)  HR: 64 (05 Jan 2021 17:17) (64 - 79)  BP: 149/53 (05 Jan 2021 17:17) (149/53 - 160/80)  BP(mean): --  RR: 18 (05 Jan 2021 09:09) (17 - 18)  SpO2: 96% (05 Jan 2021 09:09) (96% - 97%)    amLODIPine   Tablet 10 milliGRAM(s) Oral daily  aspirin  chewable 81 milliGRAM(s) Oral daily  atorvastatin 20 milliGRAM(s) Oral at bedtime  donepezil 5 milliGRAM(s) Oral at bedtime  finasteride 5 milliGRAM(s) Oral daily  guaiFENesin   Syrup  (Sugar-Free) 200 milliGRAM(s) Oral every 6 hours PRN  heparin   Injectable 5000 Unit(s) SubCutaneous every 8 hours  hydrALAZINE 10 milliGRAM(s) Oral two times a day  influenza   Vaccine 0.5 milliLiter(s) IntraMuscular once  ketotifen 0.025% Ophthalmic Solution 1 Drop(s) Both EYES two times a day  metoprolol succinate ER 50 milliGRAM(s) Oral two times a day  piperacillin/tazobactam IVPB.. 3.375 Gram(s) IV Intermittent every 8 hours  polyethylene glycol 3350 17 Gram(s) Oral two times a day  senna 2 Tablet(s) Oral at bedtime      PHYSICAL EXAM:  GENERAL: NAD,   EYES: conjunctiva and sclera clear  ENMT: Moist mucous membranes  NECK: Supple, No JVD, Normal thyroid  CHEST/LUNG: non labored, cta b/l  HEART: Regular rate and rhythm; No murmurs, rubs, or gallops  ABDOMEN: Soft, Nontender, Nondistended; Bowel sounds present  EXTREMITIES:  2+ Peripheral Pulses, No clubbing, cyanosis, or edema  LYMPH: No lymphadenopathy noted  SKIN: No rashes or lesions    Consultant(s) Notes Reviewed:  [x ] YES  [ ] NO  Care Discussed with Consultants/Other Providers [ x] YES  [ ] NO    LABS:                        9.0    7.77  )-----------( 98       ( 04 Jan 2021 02:52 )             27.3     01-05    135  |  105  |  10  ----------------------------<  105<H>  4.1   |  23  |  1.25    Ca    8.0<L>      05 Jan 2021 07:06  Phos  2.1     01-05  Mg     2.2     01-05    TPro  5.9<L>  /  Alb  2.3<L>  /  TBili  0.5  /  DBili  x   /  AST  14  /  ALT  7<L>  /  AlkPhos  54  01-05        CAPILLARY BLOOD GLUCOSE                  RADIOLOGY & ADDITIONAL TESTS:    Imaging Personally Reviewed:  [x ] YES  [ ] NO

## 2021-01-05 NOTE — PROGRESS NOTE ADULT - SUBJECTIVE AND OBJECTIVE BOX
[facility], Division of Infectious Diseases  ARLIN Nguyen Y. Patel, S. Shah  [phone]  ([phone] - weekdays after 5pm and weekends)    Name: NICOLE SORIANO  Age/Gender: 89y Male  MRN: [id number]    Interval History:  Patient with no new complaints, states he did not cough overnight.   Denies fever, chills, chest pain, abd pain, n/v/d.   ROS reviewed, pertinent positives and negatives as above.   Notes reviewed. Afebrile.     Allergies: No Known Allergies ?rash with CTX    Objective:  Vitals:   T(F): 97.9 (01-05-21 @ 09:09), Max: 97.9 (01-05-21 @ 04:25)  HR: 70 (01-05-21 @ 09:09) (65 - 87)  BP: 160/80 (01-05-21 @ 09:09) (154/83 - 169/86)  RR: 18 (01-05-21 @ 09:09) (17 - 18)  SpO2: 96% (01-05-21 @ 09:09) (96% - 99%)    Physical Examination:  General: no acute distress  HEENT: NC/AT, EOMI, anicteric, neck supple  Cardio: S1, S2 heard, RRR, no murmurs  Resp clear to auscultation bilaterally  Abd: soft, NT, ND, + BS, suprapubic catheter  Neuro: AAOxself, place and person, no obvious focal deficits  Ext: no edema or cyanosis, moving extremities  Skin: warm, dry, no visible rash, bruising on UE  Psych: appropriate affect and mood for situation  Lines: PIV    Laboratory Studies:  CBC:                       9.0    7.77  )-----------( 98       ( 04 Jan 2021 02:52 )             27.3     CMP: 01-05    135  |  105  |  10  ----------------------------<  105<H>  4.1   |  23  |  1.25    Ca    8.0<L>      05 Jan 2021 07:06  Phos  2.0     01-04  Mg     2.2     01-05    TPro  5.9<L>  /  Alb  2.3<L>  /  TBili  0.5  /  DBili  x   /  AST  14  /  ALT  7<L>  /  AlkPhos  54  01-05    LIVER FUNCTIONS - ( 05 Jan 2021 07:06 )  Alb: 2.3 g/dL / Pro: 5.9 g/dL / ALK PHOS: 54 U/L / ALT: 7 U/L / AST: 14 U/L / GGT: x           Microbiology:  12/31 - urine culture - negative  12/31 - COVID-19 ab - negative  12/31 - Legionella ur ag - negative    12/30 - blood cultures - NGTD x2  12/29 - COVID-19 PCR - negative     Radiology:  Xray Cinesophagram Swallow Function w/ Contrast (01.02.21 @ 09:44) > IMPRESSION: There is aspiration with pureed thins.  US Kidney and Bladder (12.31.20 @ 15:46) > IMPRESSION:  Status post right nephrectomy. No hydronephrosis of the left kidney. Increased cortical echogenicity of the left kidney. Multiple left renal cysts, including a cyst with multiple septations in the lower pole of the left kidney. Distended urinary bladder with moderate amount of debris within the bladder. Correlation with urinalysis is suggested. Trabeculated bladder wall with multiple diverticuli. Enlarged prostate gland.    CT Chest No Cont (12.30.20 @ 10:28) >IMPRESSION: Bronchial wall thickening in the right middle and lower lobe with associated peribronchovascular consolidation. This may be due to infection or possibly aspiration in the right clinical setting due to its location and appearance. Correlate with aspiration history.  Enlarged subcarinal lymph measuring up to 1.5 cm, nonspecific  Additional foci of patchy ground glass opacity in the right upper lobe are indeterminate. The differential for this includes infection or  malignancy. If priors are available, they should be submitted to assess for change. If none are available, consider follow-up CT chest in one month.    Medications:  MEDICATIONS  (STANDING):  amLODIPine   Tablet 5 milliGRAM(s) Oral daily  aspirin  chewable 81 milliGRAM(s) Oral daily  atorvastatin 20 milliGRAM(s) Oral at bedtime  donepezil 5 milliGRAM(s) Oral at bedtime  finasteride 5 milliGRAM(s) Oral daily  heparin   Injectable 5000 Unit(s) SubCutaneous every 8 hours  hydrALAZINE 10 milliGRAM(s) Oral two times a day  influenza   Vaccine 0.5 milliLiter(s) IntraMuscular once  ketotifen 0.025% Ophthalmic Solution 1 Drop(s) Both EYES two times a day  metoprolol succinate ER 50 milliGRAM(s) Oral two times a day  piperacillin/tazobactam IVPB.. 3.375 Gram(s) IV Intermittent every 8 hours  polyethylene glycol 3350 17 Gram(s) Oral two times a day  senna 2 Tablet(s) Oral at bedtime    Antimicrobials:  piperacillin/tazobactam IVPB.. 3.375 Gram(s) IV Intermittent every 8 hours - started 12/31  s/p azithromycin 12/30  s/p aztreonam 12/31  s/p ceftriaxone 12/30

## 2021-01-05 NOTE — DIETITIAN NUTRITION RISK NOTIFICATION - TREATMENT: THE FOLLOWING DIET HAS BEEN RECOMMENDED
Diet, Dysphagia 1 Pureed-Honey Consistency Fluid:   DASH/TLC {Sodium & Cholesterol Restricted} (DASH)  Supplement Feeding Modality:  Oral  Ensure Pudding Cans or Servings Per Day:  1       Frequency:  Three Times a day (01-04-21 @ 18:42) [Active]

## 2021-01-05 NOTE — DIETITIAN INITIAL EVALUATION ADULT. - PERTINENT MEDS FT
MEDICATIONS  (STANDING):  amLODIPine   Tablet 5 milliGRAM(s) Oral daily  aspirin  chewable 81 milliGRAM(s) Oral daily  atorvastatin 20 milliGRAM(s) Oral at bedtime  donepezil 5 milliGRAM(s) Oral at bedtime  finasteride 5 milliGRAM(s) Oral daily  heparin   Injectable 5000 Unit(s) SubCutaneous every 8 hours  hydrALAZINE 10 milliGRAM(s) Oral two times a day  influenza   Vaccine 0.5 milliLiter(s) IntraMuscular once  ketotifen 0.025% Ophthalmic Solution 1 Drop(s) Both EYES two times a day  metoprolol succinate ER 50 milliGRAM(s) Oral two times a day  piperacillin/tazobactam IVPB.. 3.375 Gram(s) IV Intermittent every 8 hours  polyethylene glycol 3350 17 Gram(s) Oral two times a day  senna 2 Tablet(s) Oral at bedtime    MEDICATIONS  (PRN):  guaiFENesin   Syrup  (Sugar-Free) 200 milliGRAM(s) Oral every 6 hours PRN Cough

## 2021-01-05 NOTE — DIETITIAN INITIAL EVALUATION ADULT. - ADD RECOMMEND
1) Encourage and assist with PO intake as needed 2) Maintain aspiration precautions 3) Monitor PO intake, BM, skin integrity, hydration status 1) Encourage and assist with PO intake as needed 2) Maintain aspiration precautions 3) Monitor PO intake, BM, skin integrity, hydration status 4) Malnutrition sticker placed

## 2021-01-05 NOTE — DIETITIAN INITIAL EVALUATION ADULT. - ORAL INTAKE PTA/DIET HISTORY
Patient is confused and disoriented, answered questions with "I don't know." Per Bedside Swallow note 12/31, patient with reduced ability to consume solid foods for the past few months, and with poor appetite and nausea for the past few weeks leading up to admission.

## 2021-01-05 NOTE — PROGRESS NOTE ADULT - PROBLEM SELECTOR PLAN 2
likely aspiration pneumonia.  Patient with nonproductive cough for about 2 weeks, no other complaints.   COVID-19 PCR negative   CT chest reported bronchial wall thickening RML and RLL with associated peribronchovascular consolidation  S/p ceftriaxone and azithromycin in the ER  S/p aztreonam, s/p azithromycin  S/p S&S eval, s/p MBS high asp risk - rec NPO/non-oral feeding  Blood cultures NGTD  Legionella urine ag negative   Afebrile, no leukocytosis  Continue on pip-tazo - plan to treat for total 7 day course, end today 1/5  Aspiration precautions - palliative care following, patients family wants home with home hospice and pleasure feeds

## 2021-01-06 RX ADMIN — FINASTERIDE 5 MILLIGRAM(S): 5 TABLET, FILM COATED ORAL at 09:03

## 2021-01-06 RX ADMIN — Medication 50 MILLIGRAM(S): at 06:14

## 2021-01-06 RX ADMIN — ATORVASTATIN CALCIUM 20 MILLIGRAM(S): 80 TABLET, FILM COATED ORAL at 21:25

## 2021-01-06 RX ADMIN — HEPARIN SODIUM 5000 UNIT(S): 5000 INJECTION INTRAVENOUS; SUBCUTANEOUS at 06:13

## 2021-01-06 RX ADMIN — KETOTIFEN FUMARATE 1 DROP(S): 0.34 SOLUTION OPHTHALMIC at 06:14

## 2021-01-06 RX ADMIN — HEPARIN SODIUM 5000 UNIT(S): 5000 INJECTION INTRAVENOUS; SUBCUTANEOUS at 13:44

## 2021-01-06 RX ADMIN — KETOTIFEN FUMARATE 1 DROP(S): 0.34 SOLUTION OPHTHALMIC at 16:21

## 2021-01-06 RX ADMIN — Medication 10 MILLIGRAM(S): at 16:21

## 2021-01-06 RX ADMIN — AMLODIPINE BESYLATE 10 MILLIGRAM(S): 2.5 TABLET ORAL at 06:13

## 2021-01-06 RX ADMIN — Medication 200 MILLIGRAM(S): at 21:29

## 2021-01-06 RX ADMIN — Medication 50 MILLIGRAM(S): at 16:21

## 2021-01-06 RX ADMIN — DONEPEZIL HYDROCHLORIDE 5 MILLIGRAM(S): 10 TABLET, FILM COATED ORAL at 21:25

## 2021-01-06 RX ADMIN — Medication 81 MILLIGRAM(S): at 09:03

## 2021-01-06 RX ADMIN — SENNA PLUS 2 TABLET(S): 8.6 TABLET ORAL at 21:25

## 2021-01-06 RX ADMIN — PIPERACILLIN AND TAZOBACTAM 25 GRAM(S): 4; .5 INJECTION, POWDER, LYOPHILIZED, FOR SOLUTION INTRAVENOUS at 06:40

## 2021-01-06 RX ADMIN — HEPARIN SODIUM 5000 UNIT(S): 5000 INJECTION INTRAVENOUS; SUBCUTANEOUS at 21:25

## 2021-01-06 RX ADMIN — Medication 10 MILLIGRAM(S): at 06:13

## 2021-01-06 RX ADMIN — POLYETHYLENE GLYCOL 3350 17 GRAM(S): 17 POWDER, FOR SOLUTION ORAL at 16:21

## 2021-01-06 NOTE — PROVIDER CONTACT NOTE (OTHER) - NAME OF MD/NP/PA/DO NOTIFIED:
Nestor, NP
SHABANA blandon
Brenda Miller
NOLVIA Rueda
NOLVIA Sorto
SHABANA Langford
Ionie Chambers NP
NOLVIA Sorto
Nora Gifford, NP
SHABANA Baez
NOLVIA Salazar
NOLVIA Dillard
NOLVIA Salazar

## 2021-01-06 NOTE — DISCHARGE NOTE PROVIDER - HOSPITAL COURSE
89M CLL (not on treatment), AAA s/p repair, R kidney removal 2/2 kidney CA  p/w weakness, fatigue and fall x 4 days found to have UTI;      Problem/Plan - 1:  ·  Problem: UTI (urinary tract infection).  Plan: u/a+    ID changed abx to zosyn for double coverage UTI and pna, till 1/7        Problem/Plan - 2:  ·  Problem: R/O Pneumonia.  Plan: r/o aspiration given dysphagia  fu ID recs- ?zosyn to cont  sp swallow eval , MBS- failed-nonoral means of nutrition-aspiration precautions, d/w necarter Fariasi-hcp- wish pleasure feeds so will resume current dysphagia diet.      Problem/Plan - 3:  ·  Problem: Syncope.  Plan: likely due to dehydration from poor po intake due to uti   trops flat, ekg no st t changes  c/w IVF, monitor on tele, orthostatics improved  lopressor prn for svt episodes, cards cs fu  cont home toprolxl, in crease to 50 bid  hypokalemia, hypomagnesemia, hypocalcemia repleted        Problem/Plan - 5:  ·  Problem: Dementia.  Plan: c/w aricept.      Problem/Plan - 6:  Problem: MICHELE (acute kidney injury). Plan: vs ckd  monitor, c/w IVF, cr improving  bladder scan- retention 400cc,  cs-pt hx BPH  renal sono reviewed- no hydro.     Problem/Plan - 7:  ·  Problem: HLD (hyperlipidemia).  Plan: c/w statin.      Problem/Plan - 8:  ·  Problem: ACP (advance care planning).  Plan: d/w oswaldo on phone-who is the HCP   Initially unclear with overall goals  wish all aggressive measures for now-MOLST done  clearly states no feeding tube  pt in the process of home hospice arrangement as outpt per CM.     Discharged to home with home hospice     89M CLL (not on treatment), AAA s/p repair, R kidney removal 2/2 kidney CA  p/w weakness, fatigue and fall x 4 days found to have UTI;      Problem/Plan - 1:  ·  Problem: UTI (urinary tract infection).   completed 7 day course of zosyn  7        Problem/Plan - 2:  ·  Problem: R/O Pneumonia.  Plan: r/o aspiration given dysphagia  completed 7 days of zosyn  sp swallow eval , MBS- failed-nonoral means of nutrition-aspiration precautions, d/w sathya York-hcp- wish pleasure feeds so will resume current dysphagia diet.      Problem/Plan - 3:  ·  Problem: Syncope.  Plan: likely due to dehydration from poor po intake due to uti   trops flat, ekg no st t changes  c/w IVF, monitor on tele, orthostatics improved  lopressor prn for svt episodes, cards cs fu  cont home toprolxl, in crease to 50 bid  hypokalemia, hypomagnesemia, hypocalcemia repleted        Problem/Plan - 5:  ·  Problem: Dementia.  Plan: c/w aricept.      Problem/Plan - 6:  Problem: MICHELE (acute kidney injury). Plan: vs ckd  monitor, c/w IVF, cr improving  bladder scan- retention 400cc,  cs-pt hx BPH; failed urethral catheterization; s/p cystoscopy and suprapubic catheter placement on 1/1/21  renal sono reviewed- no hydro.     Problem/Plan - 7:  ·  Problem: HLD (hyperlipidemia).  Plan: c/w statin.      Problem/Plan - 8:  ·  Problem: ACP (advance care planning).  Plan: d/w oswaldo on phone-who is the HCP   Initially unclear with overall goals  wish all aggressive measures for now-MOLST done  clearly states no feeding tube  pt in the process of home hospice arrangement as outpt per CM.     Discharged to home with home hospice with suprapubic catheter     89M CLL (not on treatment), AAA s/p repair, R kidney removal 2/2 kidney CA  p/w weakness, fatigue and fall x 4 days found to have UTI;      Problem/Plan - 1:  ·  Problem: UTI (urinary tract infection).   completed 7 day course of zosyn  7        Problem/Plan - 2:  ·  Problem: R/O Pneumonia.  Plan: r/o aspiration given dysphagia  completed 7 days of zosyn  sp swallow eval , MBS- failed-nonoral means of nutrition-aspiration precautions, d/w sathya York-hcp- wish pleasure feeds so will resume current dysphagia diet.      Problem/Plan - 3:  ·  Problem: Syncope.  Plan: likely due to dehydration from poor po intake due to uti   trops flat, ekg no st t changes  c/w IVF, monitor on tele, orthostatics improved  lopressor prn for svt episodes, cards cs fu  cont home toprolxl, in crease to 50 bid  hypokalemia, hypomagnesemia, hypocalcemia repleted        Problem/Plan - 5:  ·  Problem: Dementia.  Plan: c/w aricept.      Problem/Plan - 6:  Problem: MICHELE (acute kidney injury). Plan: vs ckd  monitor, c/w IVF, cr improving  bladder scan- retention 400cc,  cs-pt hx BPH; failed urethral catheterization; s/p cystoscopy and suprapubic catheter placement on 1/1/21  renal sono reviewed- no hydro.     Problem/Plan - 7:  ·  Problem: HLD (hyperlipidemia).  Plan: c/w statin.      Problem/Plan - 8:  ·  Problem: ACP (advance care planning).  Plan: d/w oswaldo on phone-who is the HCP   Initially unclear with overall goals  wish all aggressive measures for now-MOLST done  clearly states no feeding tube  pt in the process of home hospice arrangement as outpt per CM.     DCP and medication reconciliation discussed with Dr Palmer and in agreement. Discharged to home with home hospice with suprapubic catheter

## 2021-01-06 NOTE — PROVIDER CONTACT NOTE (OTHER) - ACTION/TREATMENT ORDERED:
SHABANA Melendez is aware. Will continue to monitor pt.
NP aware, will call urololgy to re-attempt to place guerra. jcarlos zamarripa
PA aware, give another dose of amlodipine 2.5 mg stat, will begin daily 5mg amlodipine, and continue to monitor.
PA notified and aware.  Give AM dose of metoprolol now.  BMP, Mg, Phos, CBC labs ordered STAT.  Continue to monitor pt & maintain safety
Advised ot to ask for assistance when he needs to get OOB, and to change positions slowly. NP notified and aware, continue to monitor
NP made aware. NP assessed pt at bedside. EKG ordered and performed. Troponin drawn. Bladder scan ordered. Will continue to monitor.
NP made aware. NP to order labs. Continue to monitor patient.
PA aware. To endorse to day team. Will continue to monitor.
PA notified and aware.  Hydralazine 10 mg x1 dose to be given, then BID starting 1/4/21.  Recheck BP at midnight.  Continue to monitor pt & maintain safety
NOLVIA Dillard aware, will continue to monitor.
PA aware. To reassess bladder scan at 0500 and intervene accordingly. Will continue to monitor.
NP notified and aware.  Bedrest ordered for patient.  Patient encouraged to hydrate more with PO fluids.  Will endorse to day RN.  Continue to monitor patient and maintain safety
PA notified and aware, continue to monitor

## 2021-01-06 NOTE — PROGRESS NOTE ADULT - PROBLEM SELECTOR PLAN 4
Link to [facility] note
resolved
new rash ,generalized non pruritic,? ceftriaxone induced  monitor on zosyn  prn benedryl
reported to have rash after ceftriaxone given, tolerated pip-tazo. No rash seen.
reported to have rash after ceftriaxone given, tolerating pip-tazo. No rash seen.
resolved  generalized non pruritic,? ceftriaxone induced  monitor on zosyn  prn benedryl

## 2021-01-06 NOTE — PROGRESS NOTE ADULT - PROBLEM SELECTOR PLAN 6
vs ckd  monitor, c/w IVF, cr improving  bladder scan- retention 400cc,  cs-pt hx BPH  renal sono reveiwed- no hydro
vs ckd  c/w IVF, cr improving  cont with suprapubic catheter  renal sono reviewed- no hydro
vs ckd  monitor, c/w IVF, cr improving  bladder scan- retention 400cc,  cs-pt hx BPH  renal sono reveiwed- no hydro
vs ckd  monitor, c/w IVF  bladder scan- retention 400cc,  cs-pt hx BPH  renal sono pending
vs ckd  monitor, c/w IVF, cr improving  bladder scan- retention 400cc,  cs-pt hx BPH  renal sono reveiwed- no hydro

## 2021-01-06 NOTE — PROGRESS NOTE ADULT - PROBLEM SELECTOR PLAN 1
UA with WBC 2037, large LE, negative nitrite, few bacteria.  Symptomatic - complained of dysuria and earlier reported suprapubic pain on admission. Has h/o CLL not on treatment.   S/p ceftriaxone in the ER, then was on aztreonam due to c/f rash with CTX  Patient reports no antibiotic allergies, tolerating pip-tazo with no reaction noted  Urine culture resulted negative, had taken levofloxacin prior to admission.   Blood cultures NGTD  Urology following, now s/p suprapubic catheter placement 1/2  Remains afebrile, no leukocytosis.   Completed Pip-tazo 7 day course UA with WBC 2037, large LE, negative nitrite, few bacteria.  Symptomatic - complained of dysuria and earlier reported suprapubic pain on admission. Has h/o CLL not on treatment.   S/p ceftriaxone in the ER, then was on aztreonam due to c/f rash with CTX  Patient reports no antibiotic allergies, tolerating pip-tazo with no reaction noted  Urine culture resulted negative, had taken levofloxacin prior to admission.   Blood cultures NGTD  Urology following, now s/p suprapubic catheter placement 1/2  Remains afebrile, no leukocytosis.   S/p pip-tazo 7 day course

## 2021-01-06 NOTE — PROVIDER CONTACT NOTE (OTHER) - ASSESSMENT
A&O x4. VS as per flowsheet. Patient positioned with x2 assist. Patient reports mild dizziness and weakness from lying to sitting position. NS 0.9% maintained at 80ml/hr. Unable to tolerate assessment of BP when standing. Safely assisted back to bed with fall precautions in place.
Patient A&Ox3, forgetful. VSS. KENRICK 104.41, HR 76, RR 18, o2 saturation 95% on room air, temperature 99.3 degrees F. Patient denies chest pain, lightheadedness, or shortness of breath. Patient has congested cough. Patient is NPO d/t aspiration risk. No AM labs ordered on this patient today. Serum potassium from 12/29 was 4.5.
Pt AOx3. C/O pain while urinating. No CP or SOB. NSR on tele at present. VSS.
pt is a+ox3 forgetful, pt asymptomatic, pt lying in Bed at this time. oral temp 97.9, hr 70, bp 160/80, pox 96% on ra
A&O x3.  Patient was asleep and asymptomatic.  VS: HR 70, 152/89, 97% O2 sat on room air, RR 18.  Patient denied CP/palpitations/SOB
vss
A&O x3, forgetful.  VS: 186/99, HR 79, temp 97.6, 98% O2 sat on room air, RR 18.  Manual BP: 190/80.  Patient asymptomatic and denies headache, pain, SOB, discomfort
A&O x3-4. VSS. Abdomen nondistended. Patient denies abdominal pain or discomfort at this time.
pt is a+o2-3, pt states he felt a little dizzy when in standing position. bp lying 166/79, hr 73, bp sitting 129/70, hr 58, bp standing 90-55, hr 79
Patient is confused, but denies c/o headache and changes in vision.
Patient remains AxO2, VSS ( see flowsheet). Patient asymptomatic at time of event, sleeping in bed.
Pt is A&ox3. VVS. No c/o chest pain/discomfort or SOB. Pt was asleep at time of ectopy
A&O x2-3, forgetful.  Lying down: 189/95, HR 79.  Sitting up: 150/81, HR 80.  Standing up: 110/70, HR 89.  As per PCA, patient c/o dizziness during time of orthostatic BP.  Pt denied dizziness when asked.  Patient previously was on NS @ 80

## 2021-01-06 NOTE — PROGRESS NOTE ADULT - NUTRITIONAL ASSESSMENT
This patient has been assessed with a concern for Malnutrition and has been determined to have a diagnosis/diagnoses of Severe protein-calorie malnutrition.    This patient is being managed with:   Diet Dysphagia 1 Pureed-Honey Consistency Fluid-  DASH/TLC {Sodium & Cholesterol Restricted} (DASH)  Supplement Feeding Modality:  Oral  Ensure Pudding Cans or Servings Per Day:  3       Frequency:  Daily  Probiotic Yogurt/Smoothie Cans or Servings Per Day:  2       Frequency:  Daily  Entered: Jan 5 2021 11:22AM

## 2021-01-06 NOTE — DISCHARGE NOTE PROVIDER - CARE PROVIDER_API CALL
GILBERTO JOHNSON  Internal Medicine  [address] [facility] [place] [address]  Phone: [phone]  Fax: [phone]  Follow Up Time: 1 week   GILBERTO JOHNSON  Internal Medicine  [address] [place] [address]  Phone: [phone]  Fax: [phone]  Follow Up Time: 1 week    Saad Duarte)  Urology  [address] [facility] [place], [facility] [address]  [place] [address]  Phone: [phone]  Fax: [phone]  Follow Up Time: 1 week

## 2021-01-06 NOTE — PROGRESS NOTE ADULT - SUBJECTIVE AND OBJECTIVE BOX
[facility], Division of Infectious Diseases  ARLIN Nguyen Y. Patel, S. Shah  [phone]  ([phone] - weekdays after 5pm and weekends)    Name: NICOLE SORIANO  Age/Gender: 89y Male  MRN: [id number]    Interval History:  Patient with no new complaints.   Denies fever, chills, chest pain, abd pain, n/v/d.   ROS reviewed, pertinent positives and negatives as above.   Notes reviewed. Remains afebrile.     Allergies: No Known Allergies ?rash with CTX    Objective:  Vitals:   T(F): 98.5 (01-06-21 @ 05:21), Max: 98.7 (01-05-21 @ 20:12)  HR: 73 (01-06-21 @ 05:21) (64 - 75)  BP: 151/79 (01-06-21 @ 05:21) (116/70 - 160/80)  RR: 18 (01-06-21 @ 07:00) (18 - 18)  SpO2: 97% (01-06-21 @ 07:00) (94% - 98%)    Physical Examination:  General: no acute distress  HEENT: NC/AT, EOMI, anicteric, neck supple  Cardio: S1, S2 heard, RRR, no murmurs  Resp clear to auscultation bilaterally  Abd: soft, NT, ND, + BS, suprapubic catheter  Neuro: AAOxself, place and person, no obvious focal deficits  Ext: no edema or cyanosis, moving extremities  Skin: warm, dry, no visible rash, bruising on UE  Psych: appropriate affect and mood for situation  Lines: PIV    Laboratory Studies:  CBC:   CMP: 01-05    135  |  105  |  10  ----------------------------<  105<H>  4.1   |  23  |  1.25    Ca    8.0<L>      05 Jan 2021 07:06  Phos  2.1     01-05  Mg     2.2     01-05    TPro  5.9<L>  /  Alb  2.3<L>  /  TBili  0.5  /  DBili  x   /  AST  14  /  ALT  7<L>  /  AlkPhos  54  01-05    LIVER FUNCTIONS - ( 05 Jan 2021 07:06 )  Alb: 2.3 g/dL / Pro: 5.9 g/dL / ALK PHOS: 54 U/L / ALT: 7 U/L / AST: 14 U/L / GGT: x                Microbiology:  12/31 - urine culture - negative  12/31 - COVID-19 ab - negative  12/31 - Legionella ur ag - negative    12/30 - blood cultures - NGTD x2  12/29 - COVID-19 PCR - negative     Radiology:  Xray Cinesophagram Swallow Function w/ Contrast (01.02.21 @ 09:44) > IMPRESSION: There is aspiration with pureed thins.  US Kidney and Bladder (12.31.20 @ 15:46) > IMPRESSION:  Status post right nephrectomy. No hydronephrosis of the left kidney. Increased cortical echogenicity of the left kidney. Multiple left renal cysts, including a cyst with multiple septations in the lower pole of the left kidney. Distended urinary bladder with moderate amount of debris within the bladder. Correlation with urinalysis is suggested. Trabeculated bladder wall with multiple diverticuli. Enlarged prostate gland.  CT Chest No Cont (12.30.20 @ 10:28) >IMPRESSION: Bronchial wall thickening in the right middle and lower lobe with associated peribronchovascular consolidation. This may be due to infection or possibly aspiration in the right clinical setting due to its location and appearance. Correlate with aspiration history.  Enlarged subcarinal lymph measuring up to 1.5 cm, nonspecific  Additional foci of patchy ground glass opacity in the right upper lobe are indeterminate. The differential for this includes infection or  malignancy. If priors are available, they should be submitted to assess for change. If none are available, consider follow-up CT chest in one month.    Medications:  MEDICATIONS  (STANDING):  amLODIPine   Tablet 10 milliGRAM(s) Oral daily  aspirin  chewable 81 milliGRAM(s) Oral daily  atorvastatin 20 milliGRAM(s) Oral at bedtime  donepezil 5 milliGRAM(s) Oral at bedtime  finasteride 5 milliGRAM(s) Oral daily  heparin   Injectable 5000 Unit(s) SubCutaneous every 8 hours  hydrALAZINE 10 milliGRAM(s) Oral two times a day  influenza   Vaccine 0.5 milliLiter(s) IntraMuscular once  ketotifen 0.025% Ophthalmic Solution 1 Drop(s) Both EYES two times a day  metoprolol succinate ER 50 milliGRAM(s) Oral two times a day  polyethylene glycol 3350 17 Gram(s) Oral two times a day  senna 2 Tablet(s) Oral at bedtime    Antimicrobials:  piperacillin/tazobactam 12/31-1/6  s/p azithromycin 12/30  s/p aztreonam 12/31  s/p ceftriaxone 12/30     [facility], Division of Infectious Diseases  ARLIN Nguyen Y. Patel, S. Shah  [phone]  ([phone] - weekdays after 5pm and weekends)    Name: NICOLE SORIANO  Age/Gender: 89y Male  MRN: [id number]    Interval History:  Patient with no new complaints, still having same cough, not bringing up phlegm.   Denies fever, chills, chest pain, abd pain, n/v/d.   ROS reviewed, pertinent positives and negatives as above.   Notes reviewed. Remains afebrile.     Allergies: No Known Allergies ?rash with CTX    Objective:  Vitals:   T(F): 98.5 (01-06-21 @ 05:21), Max: 98.7 (01-05-21 @ 20:12)  HR: 73 (01-06-21 @ 05:21) (64 - 75)  BP: 151/79 (01-06-21 @ 05:21) (116/70 - 160/80)  RR: 18 (01-06-21 @ 07:00) (18 - 18)  SpO2: 97% (01-06-21 @ 07:00) (94% - 98%)    Physical Examination:  General: no acute distress, intermittent coughing   HEENT: NC/AT, EOMI, anicteric, neck supple  Cardio: S1, S2 heard, RRR, no murmurs  Resp clear to auscultation bilaterally  Abd: soft, NT, ND, + BS, suprapubic catheter  Neuro: AAOxself, place and person, no obvious focal deficits  Ext: no edema or cyanosis, moving extremities  Skin: warm, dry, no visible rash, bruising on UE  Psych: appropriate affect and mood for situation  Lines: PIV    Laboratory Studies:  CBC:   CMP: 01-05    135  |  105  |  10  ----------------------------<  105<H>  4.1   |  23  |  1.25    Ca    8.0<L>      05 Jan 2021 07:06  Phos  2.1     01-05  Mg     2.2     01-05    TPro  5.9<L>  /  Alb  2.3<L>  /  TBili  0.5  /  DBili  x   /  AST  14  /  ALT  7<L>  /  AlkPhos  54  01-05    LIVER FUNCTIONS - ( 05 Jan 2021 07:06 )  Alb: 2.3 g/dL / Pro: 5.9 g/dL / ALK PHOS: 54 U/L / ALT: 7 U/L / AST: 14 U/L / GGT: x                Microbiology:  12/31 - urine culture - negative  12/31 - COVID-19 ab - negative  12/31 - Legionella ur ag - negative    12/30 - blood cultures - NGTD x2  12/29 - COVID-19 PCR - negative     Radiology:  Xray Cinesophagram Swallow Function w/ Contrast (01.02.21 @ 09:44) > IMPRESSION: There is aspiration with pureed thins.  US Kidney and Bladder (12.31.20 @ 15:46) > IMPRESSION:  Status post right nephrectomy. No hydronephrosis of the left kidney. Increased cortical echogenicity of the left kidney. Multiple left renal cysts, including a cyst with multiple septations in the lower pole of the left kidney. Distended urinary bladder with moderate amount of debris within the bladder. Correlation with urinalysis is suggested. Trabeculated bladder wall with multiple diverticuli. Enlarged prostate gland.  CT Chest No Cont (12.30.20 @ 10:28) >IMPRESSION: Bronchial wall thickening in the right middle and lower lobe with associated peribronchovascular consolidation. This may be due to infection or possibly aspiration in the right clinical setting due to its location and appearance. Correlate with aspiration history.  Enlarged subcarinal lymph measuring up to 1.5 cm, nonspecific  Additional foci of patchy ground glass opacity in the right upper lobe are indeterminate. The differential for this includes infection or  malignancy. If priors are available, they should be submitted to assess for change. If none are available, consider follow-up CT chest in one month.    Medications:  MEDICATIONS  (STANDING):  amLODIPine   Tablet 10 milliGRAM(s) Oral daily  aspirin  chewable 81 milliGRAM(s) Oral daily  atorvastatin 20 milliGRAM(s) Oral at bedtime  donepezil 5 milliGRAM(s) Oral at bedtime  finasteride 5 milliGRAM(s) Oral daily  heparin   Injectable 5000 Unit(s) SubCutaneous every 8 hours  hydrALAZINE 10 milliGRAM(s) Oral two times a day  influenza   Vaccine 0.5 milliLiter(s) IntraMuscular once  ketotifen 0.025% Ophthalmic Solution 1 Drop(s) Both EYES two times a day  metoprolol succinate ER 50 milliGRAM(s) Oral two times a day  polyethylene glycol 3350 17 Gram(s) Oral two times a day  senna 2 Tablet(s) Oral at bedtime    Antimicrobials:  piperacillin/tazobactam 12/31-1/6  s/p azithromycin 12/30  s/p aztreonam 12/31  s/p ceftriaxone 12/30

## 2021-01-06 NOTE — DISCHARGE NOTE PROVIDER - NSDCMRMEDTOKEN_GEN_ALL_CORE_FT
Aricept 5 mg oral tablet: 1 tab(s) orally once a day (at bedtime)  aspirin 81 mg oral tablet:   finasteride 5 mg oral tablet: 1 tab(s) orally once a day  Levaquin 500 mg oral tablet: 1 tab(s) orally every 24 hours  Metamucil:   Metoprolol Succinate ER 25 mg oral tablet, extended release: 1 tab(s) orally once a day  rosuvastatin 5 mg oral tablet: 1 tab(s) orally once a day   Aricept 5 mg oral tablet: 1 tab(s) orally once a day (at bedtime)  aspirin 81 mg oral tablet:   finasteride 5 mg oral tablet: 1 tab(s) orally once a day  guaiFENesin 100 mg/5 mL oral liquid: 10 milliliter(s) orally every 6 hours, As needed, Cough  polyethylene glycol 3350 oral powder for reconstitution: 17 gram(s) orally 2 times a day  rosuvastatin 5 mg oral tablet: 1 tab(s) orally once a day  senna oral tablet: 2 tab(s) orally once a day (at bedtime)   amLODIPine 10 mg oral tablet: 1 tab(s) orally once a day  Aricept 5 mg oral tablet: 1 tab(s) orally once a day (at bedtime)  aspirin 81 mg oral tablet:   finasteride 5 mg oral tablet: 1 tab(s) orally once a day  guaiFENesin 100 mg/5 mL oral liquid: 10 milliliter(s) orally every 6 hours, As needed, Cough  hydrALAZINE 10 mg oral tablet: 1 tab(s) orally 2 times a day  ketotifen 0.025% ophthalmic solution: 1 drop(s) to each affected eye 2 times a day, As Needed -for allergy symptoms   Metoprolol Succinate ER 50 mg oral tablet, extended release: 1 tab(s) orally 2 times a day  polyethylene glycol 3350 oral powder for reconstitution: 17 gram(s) orally 2 times a day  rosuvastatin 5 mg oral tablet: 1 tab(s) orally once a day  senna oral tablet: 2 tab(s) orally once a day (at bedtime)

## 2021-01-06 NOTE — DISCHARGE NOTE PROVIDER - NSDCCPCAREPLAN_GEN_ALL_CORE_FT
PRINCIPAL DISCHARGE DIAGNOSIS  Diagnosis: Syncope  Assessment and Plan of Treatment: HOME CARE INSTRUCTIONS  Have someone stay with you until you feel stable.  Do not drive, operate machinery, or play sports until your caregiver says it is okay.  Keep all follow-up appointments as directed by your caregiver.   Lie down right away if you start feeling like you might faint. Breathe deeply and steadily. Wait until all the symptoms have passed.Drink enough fluids to keep your urine clear or pale yellow.  If you are taking blood pressure or heart medicine, get up slowly, taking several minutes to sit and then stand. This can reduce dizziness.  SEEK IMMEDIATE MEDICAL CARE IF:  You have a severe headache.  You have unusual pain in the chest, abdomen, or back.  You are bleeding from the mouth or rectum, or you have black or tarry stool.  You have an irregular or very fast heartbeat.  You have pain with breathing.  You have repeated fainting or seizure-like jerking during an episode.  You faint when sitting or lying down.  You have confusion.  You have difficulty walking.  You have severe weakness.  You have vision problems.  If you fainted, call your local emergency services (_____________________). Do not drive yourself to the hospital        SECONDARY DISCHARGE DIAGNOSES  Diagnosis: UTI (urinary tract infection)  Assessment and Plan of Treatment: completed antibiotics    Diagnosis: Urinary retention with incomplete bladder emptying  Assessment and Plan of Treatment: continue supr pubic catheter  Follow up with Urology    Diagnosis: Dysphagia, unspecified type  Assessment and Plan of Treatment: you opted for pleasure feeds:   strict aspiration precautions    Diagnosis: MICHELE (acute kidney injury)  Assessment and Plan of Treatment: Resolved    Diagnosis: Dementia  Assessment and Plan of Treatment: continue Aricept    Diagnosis: Pneumonia  Assessment and Plan of Treatment: completed antibiotics     PRINCIPAL DISCHARGE DIAGNOSIS  Diagnosis: Syncope  Assessment and Plan of Treatment: HOME CARE INSTRUCTIONS  Have someone stay with you until you feel stable.  Do not drive, operate machinery, or play sports until your caregiver says it is okay.  Keep all follow-up appointments as directed by your caregiver.   Lie down right away if you start feeling like you might faint. Breathe deeply and steadily. Wait until all the symptoms have passed.Drink enough fluids to keep your urine clear or pale yellow.  If you are taking blood pressure or heart medicine, get up slowly, taking several minutes to sit and then stand. This can reduce dizziness.  SEEK IMMEDIATE MEDICAL CARE IF:  You have a severe headache.  You have unusual pain in the chest, abdomen, or back.  You are bleeding from the mouth or rectum, or you have black or tarry stool.  You have an irregular or very fast heartbeat.  You have pain with breathing.  You have repeated fainting or seizure-like jerking during an episode.  You faint when sitting or lying down.  You have confusion.  You have difficulty walking.  You have severe weakness.  You have vision problems.  If you fainted, call your local emergency services (_____________________). Do not drive yourself to the hospital        SECONDARY DISCHARGE DIAGNOSES  Diagnosis: UTI (urinary tract infection)  Assessment and Plan of Treatment: completed antibiotics    Diagnosis: Urinary retention with incomplete bladder emptying  Assessment and Plan of Treatment: continue supra pubic catheter  Follow up with Urology in 1 week    Diagnosis: Dysphagia, unspecified type  Assessment and Plan of Treatment: you opted for pleasure feeds with pureed food with nectar thick liquids  strict aspiration precautions    Diagnosis: MICHELE (acute kidney injury)  Assessment and Plan of Treatment: Resolved    Diagnosis: Dementia  Assessment and Plan of Treatment: continue Aricept    Diagnosis: Pneumonia  Assessment and Plan of Treatment: completed antibiotics     PRINCIPAL DISCHARGE DIAGNOSIS  Diagnosis: Syncope  Assessment and Plan of Treatment: HOME CARE INSTRUCTIONS  Have someone stay with you until you feel stable.  Do not drive, operate machinery, or play sports until your caregiver says it is okay.  Keep all follow-up appointments as directed by your caregiver.   Lie down right away if you start feeling like you might faint. Breathe deeply and steadily. Wait until all the symptoms have passed.Drink enough fluids to keep your urine clear or pale yellow.  If you are taking blood pressure or heart medicine, get up slowly, taking several minutes to sit and then stand. This can reduce dizziness.  SEEK IMMEDIATE MEDICAL CARE IF:  You have a severe headache.  You have unusual pain in the chest, abdomen, or back.  You are bleeding from the mouth or rectum, or you have black or tarry stool.  You have an irregular or very fast heartbeat.  You have pain with breathing.  You have repeated fainting or seizure-like jerking during an episode.  You faint when sitting or lying down.  You have confusion.  You have difficulty walking.  You have severe weakness.  You have vision problems.  If you fainted, call your local emergency services ([phone]). Do not drive yourself to the hospital        SECONDARY DISCHARGE DIAGNOSES  Diagnosis: Dementia  Assessment and Plan of Treatment: continue Aricept    Diagnosis: MICHELE (acute kidney injury)  Assessment and Plan of Treatment: Resolved    Diagnosis: Dysphagia, unspecified type  Assessment and Plan of Treatment: you opted for pleasure feeds with pureed food with honey thick liquids  strict aspiration precautions    Diagnosis: Urinary retention with incomplete bladder emptying  Assessment and Plan of Treatment: continue supra pubic catheter  Follow up with Urology in 1 week    Diagnosis: UTI (urinary tract infection)  Assessment and Plan of Treatment: completed antibiotics    Diagnosis: Pneumonia  Assessment and Plan of Treatment: completed antibiotics

## 2021-01-06 NOTE — PROGRESS NOTE ADULT - SUBJECTIVE AND OBJECTIVE BOX
Patient is a 89y old  Male who presents with a chief complaint of I fell (06 Jan 2021 08:29)      SUBJECTIVE / OVERNIGHT EVENTS:    Patient seen and examined. denies complaints. co fatigue.      Vital Signs Last 24 Hrs  T(C): 36.5 (06 Jan 2021 11:46), Max: 37.1 (05 Jan 2021 20:12)  T(F): 97.7 (06 Jan 2021 11:46), Max: 98.7 (05 Jan 2021 20:12)  HR: 75 (06 Jan 2021 11:46) (64 - 75)  BP: 152/87 (06 Jan 2021 11:46) (116/70 - 152/87)  BP(mean): --  RR: 18 (06 Jan 2021 11:46) (18 - 18)  SpO2: 98% (06 Jan 2021 11:46) (94% - 98%)  I&O's Summary    05 Jan 2021 07:01  -  06 Jan 2021 07:00  --------------------------------------------------------  IN: 360 mL / OUT: 1100 mL / NET: -740 mL    06 Jan 2021 07:01  -  06 Jan 2021 13:02  --------------------------------------------------------  IN: 360 mL / OUT: 250 mL / NET: 110 mL        PE:  GENERAL: NAD, AAOx1-2  HEAD:  Atraumatic, Normocephalic  CHEST/LUNG: CTABL, No wheeze  HEART: Regular rate and rhythm; no murmur  ABDOMEN: Soft, Nontender, Nondistended; Bowel sounds present  EXTREMITIES:  2+ Peripheral Pulses, No clubbing, cyanosis, or edema  gu suprapubic guerra cath  SKIN: No rashes or lesions  NEURO: No focal deficits    LABS:    01-05    135  |  105  |  10  ----------------------------<  105<H>  4.1   |  23  |  1.25    Ca    8.0<L>      05 Jan 2021 07:06  Phos  2.1     01-05  Mg     2.2     01-05    TPro  5.9<L>  /  Alb  2.3<L>  /  TBili  0.5  /  DBili  x   /  AST  14  /  ALT  7<L>  /  AlkPhos  54  01-05      CAPILLARY BLOOD GLUCOSE                RADIOLOGY & ADDITIONAL TESTS:    Imaging Personally Reviewed:  [x] YES  [ ] NO    Consultant(s) Notes Reviewed:  [x] YES  [ ] NO    MEDICATIONS  (STANDING):  amLODIPine   Tablet 10 milliGRAM(s) Oral daily  aspirin  chewable 81 milliGRAM(s) Oral daily  atorvastatin 20 milliGRAM(s) Oral at bedtime  donepezil 5 milliGRAM(s) Oral at bedtime  finasteride 5 milliGRAM(s) Oral daily  heparin   Injectable 5000 Unit(s) SubCutaneous every 8 hours  hydrALAZINE 10 milliGRAM(s) Oral two times a day  influenza   Vaccine 0.5 milliLiter(s) IntraMuscular once  ketotifen 0.025% Ophthalmic Solution 1 Drop(s) Both EYES two times a day  metoprolol succinate ER 50 milliGRAM(s) Oral two times a day  polyethylene glycol 3350 17 Gram(s) Oral two times a day  senna 2 Tablet(s) Oral at bedtime    MEDICATIONS  (PRN):  guaiFENesin   Syrup  (Sugar-Free) 200 milliGRAM(s) Oral every 6 hours PRN Cough      Care Discussed with Consultants/Other Providers [x] YES  [ ] NO    HEALTH ISSUES - PROBLEM Dx:  Palliative care encounter  Palliative care encounter    Debility  Debility    Dysphagia, unspecified type  Dysphagia, unspecified type    Anorexia  Anorexia    Urinary retention with incomplete bladder emptying  Urinary retention with incomplete bladder emptying    Pneumonia  Pneumonia    ACP (advance care planning)  ACP (advance care planning)    MICHELE (acute kidney injury)  MICHELE (acute kidney injury)    HLD (hyperlipidemia)  HLD (hyperlipidemia)    Dementia  Dementia    Syncope  Syncope    UTI (urinary tract infection)  UTI (urinary tract infection)

## 2021-01-06 NOTE — PROVIDER CONTACT NOTE (OTHER) - BACKGROUND
Patient admitted for s/p fall at home, dehydration, decreased PO intake.  PMH of CLL, AAA, renal cancer s/p right nephrectomy
Admitting dx: dehydration  PMH: n/a
Patient admitted for decreased PO intake, dehydration, s/p fall at home.  PMH of CLL, AAA, renal cancer s/p right nephrectomy
Patient admitted for dehydration
Pt s/p fall, admitted for Dehydration, UTI and PNA. Pt with c/o fatigue and weakness. Pt with hematuria, s/p trauma after guerra insertion attempt by Urology for urinary retention.
pt admitted for s/p fall and appetite loss
Patient admitted for s/p fall at home, dehydration, decreased PO intake.  PMH of CLL, AAA, renal cancer s/p right nephrectomy
Admitting dx: dehydration  PMH: MICHELE, HLD, UTI
htn
pt admitted for loss of appetite and s/p fall
Pt was admitted for Dehydration. Pt has had PAT's in the pass with HR in 150's just not as long. Pt will be discharged home tomorrow with home hospice.
patient admitted for dehydration, FTT, (+) orthostatics, and urinary retention. Patient has suprapubic catheter placed.
Dx: Dehydration Hx: Dementia, MICHELE

## 2021-01-06 NOTE — DISCHARGE NOTE PROVIDER - DETAILS OF MALNUTRITION DIAGNOSIS/DIAGNOSES
This patient has been assessed with a concern for Malnutrition and was treated during this hospitalization for the following Nutrition diagnosis/diagnoses:     -  01/05/2021: Severe protein-calorie malnutrition   This patient has been assessed with a concern for Malnutrition and was treated during this hospitalization for the following Nutrition diagnosis/diagnoses:     -  01/05/2021: Severe protein-calorie malnutrition    This patient has been assessed with a concern for Malnutrition and was treated during this hospitalization for the following Nutrition diagnosis/diagnoses:     -  01/05/2021: Severe protein-calorie malnutrition   This patient has been assessed with a concern for Malnutrition and was treated during this hospitalization for the following Nutrition diagnosis/diagnoses:     -  01/05/2021: Severe protein-calorie malnutrition    This patient has been assessed with a concern for Malnutrition and was treated during this hospitalization for the following Nutrition diagnosis/diagnoses:     -  01/05/2021: Severe protein-calorie malnutrition    This patient has been assessed with a concern for Malnutrition and was treated during this hospitalization for the following Nutrition diagnosis/diagnoses:     -  01/05/2021: Severe protein-calorie malnutrition   This patient has been assessed with a concern for Malnutrition and was treated during this hospitalization for the following Nutrition diagnosis/diagnoses:     -  01/05/2021: Severe protein-calorie malnutrition    This patient has been assessed with a concern for Malnutrition and was treated during this hospitalization for the following Nutrition diagnosis/diagnoses:     -  01/05/2021: Severe protein-calorie malnutrition    This patient has been assessed with a concern for Malnutrition and was treated during this hospitalization for the following Nutrition diagnosis/diagnoses:     -  01/05/2021: Severe protein-calorie malnutrition    This patient has been assessed with a concern for Malnutrition and was treated during this hospitalization for the following Nutrition diagnosis/diagnoses:     -  01/05/2021: Severe protein-calorie malnutrition   This patient has been assessed with a concern for Malnutrition and was treated during this hospitalization for the following Nutrition diagnosis/diagnoses:     -  01/05/2021: Severe protein-calorie malnutrition    This patient has been assessed with a concern for Malnutrition and was treated during this hospitalization for the following Nutrition diagnosis/diagnoses:     -  01/05/2021: Severe protein-calorie malnutrition    This patient has been assessed with a concern for Malnutrition and was treated during this hospitalization for the following Nutrition diagnosis/diagnoses:     -  01/05/2021: Severe protein-calorie malnutrition    This patient has been assessed with a concern for Malnutrition and was treated during this hospitalization for the following Nutrition diagnosis/diagnoses:     -  01/05/2021: Severe protein-calorie malnutrition    This patient has been assessed with a concern for Malnutrition and was treated during this hospitalization for the following Nutrition diagnosis/diagnoses:     -  01/05/2021: Severe protein-calorie malnutrition

## 2021-01-06 NOTE — PROVIDER CONTACT NOTE (OTHER) - DATE AND TIME:
04-Jan-2021 02:13
05-Jan-2021 23:15
30-Dec-2020 22:30
05-Jan-2021 05:50
03-Jan-2021 14:00
03-Jan-2021 22:00
05-Jan-2021 09:30
05-Jan-2021 13:30
06-Jan-2021 21:22
30-Dec-2020 20:30
31-Dec-2020 06:44
31-Dec-2020 08:30
01-Jan-2021 11:15

## 2021-01-06 NOTE — PROVIDER CONTACT NOTE (OTHER) - RECOMMENDATIONS
NP to review pt Chart?
Notify provider.
Restart IVF?
Make PA aware, continue to monitor.
Mg & Phos labs?
Notify NP.
PA notified.
PA to be notified.

## 2021-01-06 NOTE — PROGRESS NOTE ADULT - PROBLEM SELECTOR PROBLEM 6
MICHELE (acute kidney injury)

## 2021-01-06 NOTE — PROGRESS NOTE ADULT - PROBLEM SELECTOR PLAN 2
likely aspiration pneumonia.  Patient with nonproductive cough for about 2 weeks, no other complaints.   COVID-19 PCR negative   CT chest reported bronchial wall thickening RML and RLL with associated peribronchovascular consolidation  S/p ceftriaxone and azithromycin in the ER  S/p aztreonam, s/p azithromycin  S/p S&S eval, s/p MBS high asp risk - rec NPO/non-oral feeding  Blood cultures NGTD  Legionella urine ag negative   Afebrile, no leukocytosis  Completed pip-tazo x7d course, discontinued this morning  Aspiration precautions  Palliative care following, patients family wants home with home hospice and pleasure feeds, possible discharge today likely aspiration pneumonia.  Patient with nonproductive cough for about 2 weeks, no other complaints.   COVID-19 PCR negative   CT chest reported bronchial wall thickening RML and RLL with associated peribronchovascular consolidation  S/p ceftriaxone and azithromycin in the ER  S/p aztreonam, s/p azithromycin  S/p S&S eval, s/p MBS high asp risk - rec NPO/non-oral feeding  Blood cultures NGTD  Legionella urine ag negative   Afebrile, no leukocytosis  Completed pip-tazo x7d course, discontinued this morning   Aspiration precautions  Palliative care following, patients family wants home with home hospice and pleasure feeds, possible discharge today

## 2021-01-06 NOTE — PROGRESS NOTE ADULT - PROBLEM SELECTOR PLAN 5
c/w aricept
on aricept.
c/w aricept
on aricept.
c/w aricept
c/w aricept

## 2021-01-06 NOTE — PROGRESS NOTE ADULT - PROBLEM SELECTOR PLAN 2
r/o aspiration given dysphagia  completed zosyn  sp swallow eval, failed MBS, sathya Chela-hcp- wishes to cont with pleasure feeds so will cont current dysphagia diet

## 2021-01-06 NOTE — PROGRESS NOTE ADULT - ATTENDING COMMENTS
Total time spent including the following  [x]chart review  [x]care coordination  []discussion with the primary team  [x]discussion with the patient, surrogate decision maker, or family  Time spent: > 60
d/w hcp-bryon at length on phone -wish no artificial feeding measures, but doesn't want pt to starve , prefers pleasure feeds, understands the risk of aspiration  pall care c/s    [facility]  
[facility]  [phone]
d/w hcp-bryon at length on phone -wish no artificial feeding measures, but doesn't want pt to starve , prefers pleasure feeds, understands the risk of aspiration  pall care c/s    [facility]  
Idania Cisneros M.D.  [facility], Division of Infectious Diseases  [phone]  After 5pm on weekdays and all day on weekends - please call [phone]
Idania Cisneros M.D.  [facility], Division of Infectious Diseases  [phone]  After 5pm on weekdays and all day on weekends - please call [phone]
Idania Cisneros M.D.  [facility], Division of Infectious Diseases  [phone]  After 5pm on weekdays and all day on weekends - please call [phone]
Dr Palmer will be covering  starting 01/06/21  please call [facility] @ [phone] for questions or concerns
Idania Cisneros M.D.  [facility], Division of Infectious Diseases  [phone]  After 5pm on weekdays and all day on weekends - please call [phone]
[facility]  [phone]
Idania Cisneros M.D.  [facility], Division of Infectious Diseases  [phone]  After 5pm on weekdays and all day on weekends - please call [phone]
Idania Cisneros M.D.  [facility], Division of Infectious Diseases  [phone]  After 5pm on weekdays and all day on weekends - please call [phone]
d/w hcp-bryon at length on phone -wish no artificial feeding measures, but doesn't want pt to starve , prefers pleasure feeds, understands the risk of aspiration  pall care c/s    [facility]  
dispo: eliz planning home hospice

## 2021-01-06 NOTE — DISCHARGE NOTE PROVIDER - INSTRUCTIONS
Pureed with honey thick liquid  Low salt/cholesterol  Ensure pudding 3 servings per day  Probiotic yogurt 2 servings per day

## 2021-01-06 NOTE — PROGRESS NOTE ADULT - PROBLEM SELECTOR PROBLEM 4
R/O Rash
Palliative care encounter
R/O Rash

## 2021-01-06 NOTE — PROGRESS NOTE ADULT - PROBLEM SELECTOR PLAN 8
d/w oswaldo on phone-who is the HCP   Initially unclear with overall goals  wish all aggressive measures for now-MOLST done  clearly states no feeding tube
d/w oswaldo on phone-who is the HCP   Initially unclear with overall goals  wish all aggressive measures for now-MOLST done  clearly states no feeding tube  pt in the process of home hospice arrangement as outpt per CM
d/w oswaldo on phone-who is the HCP   wish DNR/I but unclear with overall goals  clearly states no feeding tube and no artificial breathing /ventilation
d/w oswaldo on phone-who is the HCP   Initially unclear with overall goals  wish all aggressive measures for now-MOLST done  clearly states no feeding tube
d/w oswaldo on phone-who is the HCP   Initially unclear with overall goals  wish all aggressive measures for now-MOLST done  clearly states no feeding tube
d/w oswaldo on phone-who is the HCP   wish all aggressive measures for now-MOLST done  clearly states no feeding tube  plannign for home hospice
d/w oswaldo on phone-who is the HCP   Initially unclear with overall goals  wish all aggressive measures for now-MOLST done  clearly states no feeding tube

## 2021-01-07 VITALS
OXYGEN SATURATION: 96 % | TEMPERATURE: 98 F | SYSTOLIC BLOOD PRESSURE: 152 MMHG | HEART RATE: 63 BPM | DIASTOLIC BLOOD PRESSURE: 84 MMHG | RESPIRATION RATE: 18 BRPM

## 2021-01-07 DIAGNOSIS — R55 SYNCOPE AND COLLAPSE: ICD-10-CM

## 2021-01-07 DIAGNOSIS — N39.0 URINARY TRACT INFECTION, SITE NOT SPECIFIED: ICD-10-CM

## 2021-01-07 DIAGNOSIS — I47.1 SUPRAVENTRICULAR TACHYCARDIA: ICD-10-CM

## 2021-01-07 RX ORDER — SENNA PLUS 8.6 MG/1
2 TABLET ORAL
Qty: 0 | Refills: 0 | DISCHARGE
Start: 2021-01-07

## 2021-01-07 RX ORDER — HYDRALAZINE HCL 50 MG
1 TABLET ORAL
Qty: 60 | Refills: 0
Start: 2021-01-07 | End: 2021-02-05

## 2021-01-07 RX ORDER — PSYLLIUM SEED (WITH DEXTROSE)
0 POWDER (GRAM) ORAL
Qty: 0 | Refills: 0 | DISCHARGE

## 2021-01-07 RX ORDER — AMLODIPINE BESYLATE 2.5 MG/1
1 TABLET ORAL
Qty: 30 | Refills: 0
Start: 2021-01-07 | End: 2021-02-05

## 2021-01-07 RX ORDER — KETOTIFEN FUMARATE 0.34 MG/ML
1 SOLUTION OPHTHALMIC
Qty: 2 | Refills: 0
Start: 2021-01-07 | End: 2021-02-05

## 2021-01-07 RX ORDER — POLYETHYLENE GLYCOL 3350 17 G/17G
17 POWDER, FOR SOLUTION ORAL
Qty: 0 | Refills: 0 | DISCHARGE
Start: 2021-01-07

## 2021-01-07 RX ORDER — METOPROLOL TARTRATE 50 MG
1 TABLET ORAL
Qty: 60 | Refills: 0
Start: 2021-01-07 | End: 2021-02-05

## 2021-01-07 RX ORDER — CIPROFLOXACIN LACTATE 400MG/40ML
1 VIAL (ML) INTRAVENOUS
Qty: 0 | Refills: 0 | DISCHARGE

## 2021-01-07 RX ORDER — METOPROLOL TARTRATE 50 MG
1 TABLET ORAL
Qty: 0 | Refills: 0 | DISCHARGE

## 2021-01-07 RX ADMIN — HEPARIN SODIUM 5000 UNIT(S): 5000 INJECTION INTRAVENOUS; SUBCUTANEOUS at 12:33

## 2021-01-07 RX ADMIN — Medication 10 MILLIGRAM(S): at 05:19

## 2021-01-07 RX ADMIN — Medication 81 MILLIGRAM(S): at 12:33

## 2021-01-07 RX ADMIN — AMLODIPINE BESYLATE 10 MILLIGRAM(S): 2.5 TABLET ORAL at 05:19

## 2021-01-07 RX ADMIN — KETOTIFEN FUMARATE 1 DROP(S): 0.34 SOLUTION OPHTHALMIC at 05:19

## 2021-01-07 RX ADMIN — HEPARIN SODIUM 5000 UNIT(S): 5000 INJECTION INTRAVENOUS; SUBCUTANEOUS at 05:19

## 2021-01-07 RX ADMIN — FINASTERIDE 5 MILLIGRAM(S): 5 TABLET, FILM COATED ORAL at 12:33

## 2021-01-07 RX ADMIN — Medication 50 MILLIGRAM(S): at 05:19

## 2021-01-07 NOTE — DISCHARGE NOTE NURSING/CASE MANAGEMENT/SOCIAL WORK - PATIENT PORTAL LINK FT
You can access the FollowMyHealth Patient Portal offered by [facility] by registering at the following website: [email or web address]. By joining [facility]’s FollowMyHealth portal, you will also be able to view your health information using other applications (apps) compatible with our system.

## 2021-01-07 NOTE — PROGRESS NOTE ADULT - ASSESSMENT
89M CLL (not on treatment), AAA s/p repair, R kidney removal 2/2 kidney CA  p/w weakness, fatigue and fall x 4 days found to have UTI
Patient is an 89 year old male with PMH of CLL not on treatment, AAA s/p repair, R nephrectomy due to kidney cancer and dementia who presented with complaint of  weakness, fatigue for 4 days and a fall, admitted for UTI and possible pneumonia. 
stable post sp tube  outpatient gu fu
89 year old man with history of CLL (not on treatment), AAA s/p repair, R kidney removal 2/2 kidney CA admitted with weakness, fatigue and fall x 4 days    TTE  Mild mitral regurgitation.  Mild to moderate aortic stenosis.  Low/Normal left ventricular systolic function.  Moderate pulmonary pressures.  Normal pericardium with trace pericardial effusion.
  89 year old man with history of CLL (not on treatment), AAA s/p repair, R kidney removal 2/2 kidney CA admitted with weakness, fatigue and fall x 4 days    1. Syncope vs LOC, orthostatic hypotension  -likely secondary to orthostatic hypotension  -check echo   -hydrate  -cont to monitor telemetry, brief SVT  -serial orthostatics    2. abx for uti, poss pna    3. Hypertension  -cont bb, norvasc as ordered  -can inc norvasc to 5mg daily         dvt ppx      
  89 year old man with history of CLL (not on treatment), AAA s/p repair, R kidney removal 2/2 kidney CA admitted with weakness, fatigue and fall x 4 days    1. Syncope vs LOC, orthostatic hypotension vs arrhythmia  --echo without significant cardiac pathology, low normal LVF.  Moderate pulm htn  -hydrate   -cont to monitor telemetry, brief SVT as well as brief WCT, both slightly irregular.  ? if abberancy casuing WCT vs irregular VT.  EP eval awaited,  correct Calcium  -serial orthostatics    2. abx for uti, poss pna    3. Hypertension  -cont bb, will increase amlodi[pine to 10mg daily  -consider increase BBif not improving over next 24 hours, but will await EP opinion      dvt ppx      
Patient is an 89 year old male with PMH of CLL not on treatment, AAA s/p repair, R nephrectomy due to kidney cancer and dementia who presented with complaint of  weakness, fatigue for 4 days and a fall, admitted for UTI and possible pneumonia. 
  89 year old man with history of CLL (not on treatment), AAA s/p repair, R kidney removal 2/2 kidney CA admitted with weakness, fatigue and fall x 4 days    1. Syncope vs LOC, orthostatic hypotension  -likely secondary to orthostatic hypotension  -check echo   -hydrate  -cont to monitor telemetry, brief SVT  -serial orthostatics    2. abx for uti, poss pna  uro events noted      dvt ppx      
  89 year old man with history of CLL (not on treatment), AAA s/p repair, R kidney removal 2/2 kidney CA admitted with weakness, fatigue and fall x 4 days    1. Syncope vs LOC, orthostatic hypotension vs arrhythmia  --awaitecho   -hydrate   -cont to monitor telemetry, brief SVT as well as brief WCT, both slightly irregular.  ? if abberancy casuing WCT vs irregular VT.  EP eval awaited,  correct Calcium  -serial orthostatics    2. abx for uti, poss pna    3. Hypertension  -cont bb, norvasc as ordered  -consider increase BB vs amlod if not improving over next 24 hours, await EP opinion      dvt ppx      
Patient is an 89 year old male with PMH of CLL not on treatment, AAA s/p repair, R nephrectomy due to kidney cancer and dementia who presented with complaint of  weakness, fatigue for 4 days and a fall, admitted for UTI and possible pneumonia. 
89M CLL (not on treatment), AAA s/p repair, R kidney removal 2/2 kidney CA  p/w weakness, fatigue and fall x 4 days found to have UTI
Patient is an 89 year old male with PMH of CLL not on treatment, AAA s/p repair, R nephrectomy due to kidney cancer and dementia who presented with complaint of  weakness, fatigue for 4 days and a fall, admitted for UTI and possible pneumonia. 
89M CLL (not on treatment), AAA s/p repair, R kidney removal 2/2 kidney CA  p/w weakness, fatigue and fall x 4 days found to have UTI
Patient is an 89 year old male with PMH of CLL not on treatment, AAA s/p repair, R nephrectomy due to kidney cancer and dementia who presented with complaint of  weakness, fatigue for 4 days and a fall, admitted for UTI and possible pneumonia. 
89M CLL (not on treatment), AAA s/p repair, R kidney removal 2/2 kidney CA  p/w weakness, fatigue and fall x 4 days found to have UTI
Patient is an 89 year old male with PMH of CLL not on treatment, AAA s/p repair, R nephrectomy due to kidney cancer and dementia who presented with complaint of  weakness, fatigue for 4 days and a fall, admitted for UTI and possible pneumonia. 
89M CLL (not on treatment), AAA s/p repair, R kidney removal 2/2 kidney CA  p/w weakness, fatigue and fall x 4 days found to have UTI

## 2021-01-07 NOTE — PROGRESS NOTE ADULT - PROVIDER SPECIALTY LIST ADULT
Cardiology
Cardiology
Internal Medicine
Cardiology
Urology
Cardiology
Palliative Care
Infectious Disease
Urology
Infectious Disease
Cardiology
Infectious Disease
Infectious Disease
Internal Medicine
Infectious Disease
Infectious Disease
Internal Medicine

## 2021-01-07 NOTE — PROGRESS NOTE ADULT - SUBJECTIVE AND OBJECTIVE BOX
[facility] Progress Note  _______________________________    Pt. seen and examined. No new cardiac-related complaints.    Telemetry -sinus 60-80s, brief runs of PAT    T(C): 36.6 (01-07-21 @ 04:18), Max: 36.7 (01-06-21 @ 20:29)  HR: 70 (01-07-21 @ 04:18) (68 - 76)  BP: 146/79 (01-07-21 @ 04:18) (146/79 - 158/87)  RR: 18 (01-07-21 @ 07:00) (18 - 18)  SpO2: 95% (01-07-21 @ 07:00) (95% - 99%)  I&O's Summary    06 Jan 2021 07:01  -  07 Jan 2021 07:00  --------------------------------------------------------  IN: 860 mL / OUT: 2450 mL / NET: -1590 mL        PHYSICAL EXAM:  GENERAL: Alert, NAD.  NECK: Supple  CHEST/LUNG: Clear to auscultation bilaterally; No wheezes, rales, or rhonchi.  HEART: S1 S2 normal, RRR; No murmurs, rubs, or gallops  ABDOMEN: Soft, Nondistended  EXTREMITIES:  No LE edema.      LABS:                        MEDICATIONS  (STANDING):  amLODIPine   Tablet 10 milliGRAM(s) Oral daily  aspirin  chewable 81 milliGRAM(s) Oral daily  atorvastatin 20 milliGRAM(s) Oral at bedtime  donepezil 5 milliGRAM(s) Oral at bedtime  finasteride 5 milliGRAM(s) Oral daily  heparin   Injectable 5000 Unit(s) SubCutaneous every 8 hours  hydrALAZINE 10 milliGRAM(s) Oral two times a day  influenza   Vaccine 0.5 milliLiter(s) IntraMuscular once  ketotifen 0.025% Ophthalmic Solution 1 Drop(s) Both EYES two times a day  metoprolol succinate ER 50 milliGRAM(s) Oral two times a day  polyethylene glycol 3350 17 Gram(s) Oral two times a day  senna 2 Tablet(s) Oral at bedtime    MEDICATIONS  (PRN):  guaiFENesin   Syrup  (Sugar-Free) 200 milliGRAM(s) Oral every 6 hours PRN Cough        RADIOLOGY & ADDITIONAL TESTS:

## 2021-01-07 NOTE — PROGRESS NOTE ADULT - PROBLEM SELECTOR PLAN 1
Likely dehydration, infection related.  -orthostatics positive.   -status post IV fluids. improving. if orthostatics not improving can stop proscar.   -TTE as above.  -Infection treatment as per primary team.

## 2021-01-07 NOTE — PROGRESS NOTE ADULT - REASON FOR ADMISSION
I fell

## 2021-01-07 NOTE — PROGRESS NOTE ADULT - PROBLEM SELECTOR PLAN 3
likely due to dehydration from poor po intake due to uti   trops flat, ekg no st t changes  orthostatics improved  lopressor prn for svt episodes, cards cs fu  toprol xl increased to 50 bid
likely due to dehydration from poor po intake due to uti   trops flat, ekg no st t changes  c/w IVF, monitor on tele, orthostatics improved  lopressor prn for svt episodes, cards cs fu  cont home toprolxl, in crease to 50 bid  hypokalemia, hypomagnesemia, hypocalcemia  replete K, mg, calcium
likely due to dehydration, poor oral intake and urinary retention. Cr improved after suprapubic catheter placement. Renally adjusted antibiotics as above. Management per primary team.
-  -treatment as per primary team.    Roberto Heredia D.O.  [phone]
likely due to dehydration, poor oral intake and urinary retention. Cr improved to 1.23 after suprapubic catheter placement. Renally adjusted antibiotics as above. Management per primary team.
likely due to dehydration and poor oral intake, renally adjusted antibiotics as above. Management per primary team.
likely due to dehydration from poor po intake due to uti   trops flat, ekg no st t changes  c/w IVF, monitor on tele, orthostatics improved  lopressor prn for svt episodes, cards cs am  cont home toprolxl  check ct chest ? TERESA opacity
likely due to dehydration from poor po intake due to uti   trops flat, ekg no st t changes  c/w IVF, monitor on tele, orthostatics improved  lopressor prn for svt episodes, cards cs fu  cont home toprolxl  check ct chest ? TERESA opacity
likely due to dehydration from poor po intake due to uti   trops flat, ekg no st t changes  c/w IVF, monitor on tele, orthostatics improved  lopressor prn for svt episodes, cards cs fu  cont home toprolxl  check ct chest ? TERESA opacity
likely due to dehydration and poor oral intake, renally adjusted antibiotics as above. Management per primary team.
likely due to dehydration from poor po intake due to uti   trops flat, ekg no st t changes  c/w IVF, monitor on tele, orthostatics improved  lopressor prn for svt episodes, cards cs fu  cont home toprolxl  check ct chest ? TERESA opacity
PPS 60
likely due to dehydration from poor po intake due to uti   trops flat, ekg no st t changes  c/w IVF, monitor on tele, orthostatics improved  lopressor prn for svt episodes, cards cs fu  cont home toprolxl, in crease to 50 bid  hypokalemia, hypomagnesemia, hypocalcemia  replete K, mg, calcium
likely due to dehydration, poor oral intake and urinary retention. Cr improved after suprapubic catheter placement. Renally adjusted antibiotics as above. Management per primary team.
likely due to dehydration and poor oral intake, renally adjusted antibiotics as above. Management per primary team.

## 2021-01-07 NOTE — PROGRESS NOTE ADULT - PROBLEM SELECTOR PROBLEM 1
Anorexia
UTI (urinary tract infection)
Urinary retention with incomplete bladder emptying
UTI (urinary tract infection)
Syncope, unspecified syncope type
UTI (urinary tract infection)

## 2021-01-07 NOTE — PROGRESS NOTE ADULT - PROBLEM SELECTOR PROBLEM 3
MICHELE (acute kidney injury)
Syncope
Syncope
Debility
MICHELE (acute kidney injury)
Syncope
MICHELE (acute kidney injury)
Syncope
Urinary tract infection without hematuria, site unspecified
MICHELE (acute kidney injury)
Syncope
Syncope
MICHELE (acute kidney injury)
MICHELE (acute kidney injury)
Syncope

## 2021-01-07 NOTE — PROGRESS NOTE ADULT - PROBLEM SELECTOR PROBLEM 2
Pneumonia
Pneumonia
R/O Pneumonia
Pneumonia
Pneumonia
Paroxysmal atrial tachycardia
Pneumonia
R/O Pneumonia
Dysphagia, unspecified type
R/O Pneumonia
R/O Pneumonia
Pneumonia
R/O Pneumonia

## 2021-01-20 PROCEDURE — 82803 BLOOD GASES ANY COMBINATION: CPT

## 2021-01-20 PROCEDURE — 85730 THROMBOPLASTIN TIME PARTIAL: CPT

## 2021-01-20 PROCEDURE — 81001 URINALYSIS AUTO W/SCOPE: CPT

## 2021-01-20 PROCEDURE — 71250 CT THORAX DX C-: CPT

## 2021-01-20 PROCEDURE — 82330 ASSAY OF CALCIUM: CPT

## 2021-01-20 PROCEDURE — 85014 HEMATOCRIT: CPT

## 2021-01-20 PROCEDURE — 85610 PROTHROMBIN TIME: CPT

## 2021-01-20 PROCEDURE — 83735 ASSAY OF MAGNESIUM: CPT

## 2021-01-20 PROCEDURE — 74230 X-RAY XM SWLNG FUNCJ C+: CPT

## 2021-01-20 PROCEDURE — 84100 ASSAY OF PHOSPHORUS: CPT

## 2021-01-20 PROCEDURE — 87040 BLOOD CULTURE FOR BACTERIA: CPT

## 2021-01-20 PROCEDURE — 85027 COMPLETE CBC AUTOMATED: CPT

## 2021-01-20 PROCEDURE — 72170 X-RAY EXAM OF PELVIS: CPT

## 2021-01-20 PROCEDURE — 93005 ELECTROCARDIOGRAM TRACING: CPT

## 2021-01-20 PROCEDURE — 93306 TTE W/DOPPLER COMPLETE: CPT

## 2021-01-20 PROCEDURE — 70450 CT HEAD/BRAIN W/O DYE: CPT

## 2021-01-20 PROCEDURE — 82947 ASSAY GLUCOSE BLOOD QUANT: CPT

## 2021-01-20 PROCEDURE — 92526 ORAL FUNCTION THERAPY: CPT

## 2021-01-20 PROCEDURE — 85018 HEMOGLOBIN: CPT

## 2021-01-20 PROCEDURE — 80053 COMPREHEN METABOLIC PANEL: CPT

## 2021-01-20 PROCEDURE — 83605 ASSAY OF LACTIC ACID: CPT

## 2021-01-20 PROCEDURE — 84132 ASSAY OF SERUM POTASSIUM: CPT

## 2021-01-20 PROCEDURE — 87086 URINE CULTURE/COLONY COUNT: CPT

## 2021-01-20 PROCEDURE — 92611 MOTION FLUOROSCOPY/SWALLOW: CPT

## 2021-01-20 PROCEDURE — 76770 US EXAM ABDO BACK WALL COMP: CPT

## 2021-01-20 PROCEDURE — 73030 X-RAY EXAM OF SHOULDER: CPT

## 2021-01-20 PROCEDURE — 87635 SARS-COV-2 COVID-19 AMP PRB: CPT

## 2021-01-20 PROCEDURE — 82306 VITAMIN D 25 HYDROXY: CPT

## 2021-01-20 PROCEDURE — 71045 X-RAY EXAM CHEST 1 VIEW: CPT

## 2021-01-20 PROCEDURE — 85025 COMPLETE CBC W/AUTO DIFF WBC: CPT

## 2021-01-20 PROCEDURE — 84484 ASSAY OF TROPONIN QUANT: CPT

## 2021-01-20 PROCEDURE — 82435 ASSAY OF BLOOD CHLORIDE: CPT

## 2021-01-20 PROCEDURE — 86769 SARS-COV-2 COVID-19 ANTIBODY: CPT

## 2021-01-20 PROCEDURE — 99285 EMERGENCY DEPT VISIT HI MDM: CPT

## 2021-01-20 PROCEDURE — 82962 GLUCOSE BLOOD TEST: CPT

## 2021-01-20 PROCEDURE — 97162 PT EVAL MOD COMPLEX 30 MIN: CPT

## 2021-01-20 PROCEDURE — 80048 BASIC METABOLIC PNL TOTAL CA: CPT

## 2021-01-20 PROCEDURE — 92610 EVALUATE SWALLOWING FUNCTION: CPT

## 2021-01-20 PROCEDURE — 87449 NOS EACH ORGANISM AG IA: CPT

## 2021-01-20 PROCEDURE — 84295 ASSAY OF SERUM SODIUM: CPT

## 2021-04-20 NOTE — STUDENT SIGN OFF DOCUMENT - DOCUMENTS STUDENTS ARE SIGNED OFF ON
Plan of Care Dietitian Initial Evaluation Spironolactone Pregnancy And Lactation Text: This medication can cause feminization of the male fetus and should be avoided during pregnancy. The active metabolite is also found in breast milk.

## 2022-07-28 NOTE — ED ADULT TRIAGE NOTE - CHIEF COMPLAINT QUOTE
7/28/22 est pt .  doing very well pucai 0 1BM/day, BSS4, no blood, no abdominal pain IFX 800mg q6 weeks, stable. TB neg 1/2022 . syncope, fell today, hypotension. c/o rt shoulder, arm pain, on aspirin. Seen and examined by PMD and was Dx with Bronchitis, swab pending results.

## 2022-09-05 NOTE — PHYSICAL THERAPY INITIAL EVALUATION ADULT - ACTIVE RANGE OF MOTION EXAMINATION, REHAB EVAL
Home bilateral upper extremity Active ROM was WFL (within functional limits)/bilateral  lower extremity Active ROM was WFL (within functional limits)
